# Patient Record
Sex: FEMALE | Race: OTHER | HISPANIC OR LATINO | Employment: UNEMPLOYED | URBAN - METROPOLITAN AREA
[De-identification: names, ages, dates, MRNs, and addresses within clinical notes are randomized per-mention and may not be internally consistent; named-entity substitution may affect disease eponyms.]

---

## 2017-05-23 ENCOUNTER — HOSPITAL ENCOUNTER (EMERGENCY)
Facility: HOSPITAL | Age: 32
Discharge: HOME | End: 2017-05-23
Attending: EMERGENCY MEDICINE | Admitting: EMERGENCY MEDICINE
Payer: COMMERCIAL

## 2017-05-23 VITALS
TEMPERATURE: 98.4 F | RESPIRATION RATE: 16 BRPM | DIASTOLIC BLOOD PRESSURE: 89 MMHG | OXYGEN SATURATION: 100 % | WEIGHT: 160 LBS | SYSTOLIC BLOOD PRESSURE: 135 MMHG | HEART RATE: 76 BPM

## 2017-05-23 DIAGNOSIS — R10.2 PELVIC PAIN: Primary | ICD-10-CM

## 2017-05-23 LAB
BACTERIA UR QL AUTO: ABNORMAL /HPF
BILIRUB UR QL STRIP: NEGATIVE
CLARITY UR: ABNORMAL
COLOR UR: YELLOW
GLUCOSE UR STRIP-MCNC: NEGATIVE MG/DL
HCG UR QL: NEGATIVE
HGB UR QL STRIP.AUTO: NEGATIVE
KETONES UR STRIP-MCNC: NEGATIVE MG/DL
LEUKOCYTE ESTERASE UR QL STRIP: NEGATIVE
NITRITE UR QL STRIP: NEGATIVE
NON-SQ EPI CELLS URNS QL MICRO: ABNORMAL /HPF
PH UR STRIP.AUTO: 5.5 [PH] (ref 4.5–8)
PROT UR STRIP-MCNC: ABNORMAL MG/DL
RBC #/AREA URNS AUTO: ABNORMAL /HPF
SP GR UR STRIP.AUTO: 1.02 (ref 1–1.03)
UROBILINOGEN UR QL STRIP.AUTO: 0.2 E.U./DL
WBC #/AREA URNS AUTO: ABNORMAL /HPF

## 2017-05-23 PROCEDURE — 81025 URINE PREGNANCY TEST: CPT | Performed by: EMERGENCY MEDICINE

## 2017-05-23 PROCEDURE — 99284 EMERGENCY DEPT VISIT MOD MDM: CPT

## 2017-05-23 PROCEDURE — 81002 URINALYSIS NONAUTO W/O SCOPE: CPT | Performed by: EMERGENCY MEDICINE

## 2017-05-23 PROCEDURE — 81001 URINALYSIS AUTO W/SCOPE: CPT

## 2017-05-23 RX ORDER — IBUPROFEN 400 MG/1
400 TABLET ORAL EVERY 6 HOURS PRN
Qty: 30 TABLET | Refills: 0 | Status: SHIPPED | OUTPATIENT
Start: 2017-05-23

## 2017-05-23 RX ORDER — IBUPROFEN 400 MG/1
400 TABLET ORAL ONCE
Status: COMPLETED | OUTPATIENT
Start: 2017-05-23 | End: 2017-05-23

## 2017-05-23 RX ADMIN — IBUPROFEN 400 MG: 400 TABLET ORAL at 20:23

## 2020-10-06 RX ORDER — LEVOCETIRIZINE DIHYDROCHLORIDE 5 MG/1
5 TABLET, FILM COATED ORAL EVERY EVENING
COMMUNITY

## 2020-10-06 RX ORDER — VENLAFAXINE HYDROCHLORIDE 75 MG/1
75 CAPSULE, EXTENDED RELEASE ORAL DAILY
COMMUNITY
End: 2020-12-07

## 2020-10-06 RX ORDER — FERROUS SULFATE 325(65) MG
325 TABLET ORAL
COMMUNITY

## 2020-10-06 RX ORDER — TRAZODONE HYDROCHLORIDE 50 MG/1
50 TABLET ORAL
COMMUNITY

## 2020-10-06 RX ORDER — EPINEPHRINE 0.15 MG/.3ML
0.15 INJECTION INTRAMUSCULAR ONCE
COMMUNITY

## 2020-10-06 RX ORDER — FAMOTIDINE 20 MG/1
20 TABLET, FILM COATED ORAL DAILY
COMMUNITY

## 2020-10-06 RX ORDER — LEVOTHYROXINE SODIUM 0.2 MG/1
175 TABLET ORAL DAILY
COMMUNITY

## 2020-10-06 RX ORDER — FLUCONAZOLE 150 MG/1
150 TABLET ORAL DAILY
COMMUNITY
End: 2020-12-07

## 2020-10-06 RX ORDER — ACETAZOLAMIDE 250 MG/1
250 TABLET ORAL 2 TIMES DAILY
COMMUNITY

## 2020-10-06 RX ORDER — DOCUSATE SODIUM 100 MG/1
100 CAPSULE, LIQUID FILLED ORAL 2 TIMES DAILY
COMMUNITY

## 2020-10-08 ENCOUNTER — ANESTHESIA EVENT (OUTPATIENT)
Dept: PERIOP | Facility: HOSPITAL | Age: 35
End: 2020-10-08
Payer: COMMERCIAL

## 2020-10-08 PROBLEM — F32.A DEPRESSION: Status: ACTIVE | Noted: 2020-10-08

## 2020-10-08 PROBLEM — E03.9 HYPOTHYROIDISM: Status: ACTIVE | Noted: 2020-10-08

## 2020-10-08 PROBLEM — F41.9 ANXIETY: Status: ACTIVE | Noted: 2020-10-08

## 2020-10-08 PROBLEM — G47.30 SLEEP APNEA: Status: ACTIVE | Noted: 2020-10-08

## 2020-10-08 PROBLEM — J45.909 ASTHMA: Status: ACTIVE | Noted: 2020-10-08

## 2020-10-09 ENCOUNTER — ANESTHESIA (OUTPATIENT)
Dept: PERIOP | Facility: HOSPITAL | Age: 35
End: 2020-10-09
Payer: COMMERCIAL

## 2020-10-09 ENCOUNTER — HOSPITAL ENCOUNTER (OUTPATIENT)
Facility: HOSPITAL | Age: 35
Setting detail: OUTPATIENT SURGERY
Discharge: HOME | End: 2020-10-09
Attending: COLON & RECTAL SURGERY | Admitting: COLON & RECTAL SURGERY
Payer: COMMERCIAL

## 2020-10-09 VITALS
BODY MASS INDEX: 34.91 KG/M2 | WEIGHT: 197 LBS | TEMPERATURE: 96.3 F | HEIGHT: 63 IN | DIASTOLIC BLOOD PRESSURE: 84 MMHG | SYSTOLIC BLOOD PRESSURE: 133 MMHG | RESPIRATION RATE: 18 BRPM | HEART RATE: 70 BPM | OXYGEN SATURATION: 100 %

## 2020-10-09 VITALS — HEART RATE: 87 BPM

## 2020-10-09 DIAGNOSIS — D17.23 BENIGN LIPOMATOUS NEOPLASM OF SKIN AND SUBCUTANEOUS TISSUE OF RIGHT LEG: ICD-10-CM

## 2020-10-09 PROBLEM — F41.9 ANXIETY: Status: RESOLVED | Noted: 2020-10-08 | Resolved: 2020-10-09

## 2020-10-09 PROBLEM — J45.909 ASTHMA: Status: RESOLVED | Noted: 2020-10-08 | Resolved: 2020-10-09

## 2020-10-09 LAB
EXT PREGNANCY TEST URINE: NEGATIVE
EXT. CONTROL: NORMAL

## 2020-10-09 PROCEDURE — 81025 URINE PREGNANCY TEST: CPT | Performed by: COLON & RECTAL SURGERY

## 2020-10-09 PROCEDURE — 88304 TISSUE EXAM BY PATHOLOGIST: CPT | Performed by: PATHOLOGY

## 2020-10-09 RX ORDER — ONDANSETRON 2 MG/ML
INJECTION INTRAMUSCULAR; INTRAVENOUS AS NEEDED
Status: DISCONTINUED | OUTPATIENT
Start: 2020-10-09 | End: 2020-10-09

## 2020-10-09 RX ORDER — ONDANSETRON 2 MG/ML
4 INJECTION INTRAMUSCULAR; INTRAVENOUS ONCE AS NEEDED
Status: DISCONTINUED | OUTPATIENT
Start: 2020-10-09 | End: 2020-10-09 | Stop reason: HOSPADM

## 2020-10-09 RX ORDER — MIDAZOLAM HYDROCHLORIDE 2 MG/2ML
INJECTION, SOLUTION INTRAMUSCULAR; INTRAVENOUS AS NEEDED
Status: DISCONTINUED | OUTPATIENT
Start: 2020-10-09 | End: 2020-10-09

## 2020-10-09 RX ORDER — SODIUM CHLORIDE, SODIUM LACTATE, POTASSIUM CHLORIDE, CALCIUM CHLORIDE 600; 310; 30; 20 MG/100ML; MG/100ML; MG/100ML; MG/100ML
100 INJECTION, SOLUTION INTRAVENOUS CONTINUOUS
Status: DISCONTINUED | OUTPATIENT
Start: 2020-10-09 | End: 2020-10-09 | Stop reason: HOSPADM

## 2020-10-09 RX ORDER — PROMETHAZINE HYDROCHLORIDE 25 MG/ML
12.5 INJECTION, SOLUTION INTRAMUSCULAR; INTRAVENOUS ONCE AS NEEDED
Status: DISCONTINUED | OUTPATIENT
Start: 2020-10-09 | End: 2020-10-09 | Stop reason: HOSPADM

## 2020-10-09 RX ORDER — MAGNESIUM HYDROXIDE 1200 MG/15ML
LIQUID ORAL AS NEEDED
Status: DISCONTINUED | OUTPATIENT
Start: 2020-10-09 | End: 2020-10-09 | Stop reason: HOSPADM

## 2020-10-09 RX ORDER — SODIUM CHLORIDE, SODIUM LACTATE, POTASSIUM CHLORIDE, CALCIUM CHLORIDE 600; 310; 30; 20 MG/100ML; MG/100ML; MG/100ML; MG/100ML
50 INJECTION, SOLUTION INTRAVENOUS CONTINUOUS
Status: DISCONTINUED | OUTPATIENT
Start: 2020-10-09 | End: 2020-10-09 | Stop reason: HOSPADM

## 2020-10-09 RX ORDER — SODIUM CHLORIDE, SODIUM LACTATE, POTASSIUM CHLORIDE, CALCIUM CHLORIDE 600; 310; 30; 20 MG/100ML; MG/100ML; MG/100ML; MG/100ML
INJECTION, SOLUTION INTRAVENOUS CONTINUOUS PRN
Status: DISCONTINUED | OUTPATIENT
Start: 2020-10-09 | End: 2020-10-09

## 2020-10-09 RX ORDER — LIDOCAINE HYDROCHLORIDE 10 MG/ML
INJECTION, SOLUTION EPIDURAL; INFILTRATION; INTRACAUDAL; PERINEURAL AS NEEDED
Status: DISCONTINUED | OUTPATIENT
Start: 2020-10-09 | End: 2020-10-09

## 2020-10-09 RX ORDER — FENTANYL CITRATE 50 UG/ML
INJECTION, SOLUTION INTRAMUSCULAR; INTRAVENOUS AS NEEDED
Status: DISCONTINUED | OUTPATIENT
Start: 2020-10-09 | End: 2020-10-09

## 2020-10-09 RX ORDER — PROPOFOL 10 MG/ML
INJECTION, EMULSION INTRAVENOUS CONTINUOUS PRN
Status: DISCONTINUED | OUTPATIENT
Start: 2020-10-09 | End: 2020-10-09

## 2020-10-09 RX ORDER — HYDROMORPHONE HCL/PF 1 MG/ML
0.5 SYRINGE (ML) INJECTION
Status: DISCONTINUED | OUTPATIENT
Start: 2020-10-09 | End: 2020-10-09 | Stop reason: HOSPADM

## 2020-10-09 RX ADMIN — LIDOCAINE HYDROCHLORIDE 50 MG: 10 INJECTION, SOLUTION EPIDURAL; INFILTRATION; INTRACAUDAL; PERINEURAL at 12:30

## 2020-10-09 RX ADMIN — SODIUM CHLORIDE, SODIUM LACTATE, POTASSIUM CHLORIDE, AND CALCIUM CHLORIDE: .6; .31; .03; .02 INJECTION, SOLUTION INTRAVENOUS at 12:15

## 2020-10-09 RX ADMIN — FENTANYL CITRATE 100 MCG: 50 INJECTION INTRAMUSCULAR; INTRAVENOUS at 12:30

## 2020-10-09 RX ADMIN — PROPOFOL 100 MCG/KG/MIN: 10 INJECTION, EMULSION INTRAVENOUS at 12:30

## 2020-10-09 RX ADMIN — MIDAZOLAM HYDROCHLORIDE 2 MG: 1 INJECTION, SOLUTION INTRAMUSCULAR; INTRAVENOUS at 12:23

## 2020-10-09 RX ADMIN — ONDANSETRON HYDROCHLORIDE 4 MG: 2 INJECTION, SOLUTION INTRAMUSCULAR; INTRAVENOUS at 12:30

## 2020-12-07 ENCOUNTER — HOSPITAL ENCOUNTER (EMERGENCY)
Facility: HOSPITAL | Age: 35
Discharge: HOME | End: 2020-12-07
Attending: EMERGENCY MEDICINE | Admitting: EMERGENCY MEDICINE
Payer: COMMERCIAL

## 2020-12-07 VITALS
TEMPERATURE: 98.3 F | RESPIRATION RATE: 16 BRPM | DIASTOLIC BLOOD PRESSURE: 92 MMHG | SYSTOLIC BLOOD PRESSURE: 136 MMHG | OXYGEN SATURATION: 100 % | WEIGHT: 205.03 LBS | HEART RATE: 86 BPM | BODY MASS INDEX: 36.32 KG/M2

## 2020-12-07 DIAGNOSIS — R10.9 ABDOMINAL PAIN: Primary | ICD-10-CM

## 2020-12-07 LAB
ALBUMIN SERPL BCP-MCNC: 3.5 G/DL (ref 3.5–5)
ALP SERPL-CCNC: 93 U/L (ref 46–116)
ALT SERPL W P-5'-P-CCNC: 33 U/L (ref 12–78)
ANION GAP SERPL CALCULATED.3IONS-SCNC: 11 MMOL/L (ref 4–13)
AST SERPL W P-5'-P-CCNC: 26 U/L (ref 5–45)
BASOPHILS # BLD AUTO: 0.05 THOUSANDS/ΜL (ref 0–0.1)
BASOPHILS NFR BLD AUTO: 1 % (ref 0–1)
BILIRUB DIRECT SERPL-MCNC: 0.04 MG/DL (ref 0–0.2)
BILIRUB SERPL-MCNC: 0.21 MG/DL (ref 0.2–1)
BILIRUB UR QL STRIP: NEGATIVE
BUN SERPL-MCNC: 13 MG/DL (ref 5–25)
CALCIUM SERPL-MCNC: 8.8 MG/DL (ref 8.3–10.1)
CHLORIDE SERPL-SCNC: 104 MMOL/L (ref 100–108)
CLARITY UR: CLEAR
CLARITY, POC: CLEAR
CO2 SERPL-SCNC: 24 MMOL/L (ref 21–32)
COLOR UR: YELLOW
COLOR, POC: YELLOW
CREAT SERPL-MCNC: 0.9 MG/DL (ref 0.6–1.3)
EOSINOPHIL # BLD AUTO: 0.53 THOUSAND/ΜL (ref 0–0.61)
EOSINOPHIL NFR BLD AUTO: 6 % (ref 0–6)
ERYTHROCYTE [DISTWIDTH] IN BLOOD BY AUTOMATED COUNT: 14.9 % (ref 11.6–15.1)
EXT PREG TEST URINE: NEGATIVE
EXT. CONTROL ED NAV: NORMAL
GFR SERPL CREATININE-BSD FRML MDRD: 83 ML/MIN/1.73SQ M
GLUCOSE SERPL-MCNC: 142 MG/DL (ref 65–140)
GLUCOSE UR STRIP-MCNC: NEGATIVE MG/DL
HCG SERPL QL: NEGATIVE
HCT VFR BLD AUTO: 36.9 % (ref 34.8–46.1)
HGB BLD-MCNC: 11.5 G/DL (ref 11.5–15.4)
HGB UR QL STRIP.AUTO: NEGATIVE
IMM GRANULOCYTES # BLD AUTO: 0.03 THOUSAND/UL (ref 0–0.2)
IMM GRANULOCYTES NFR BLD AUTO: 0 % (ref 0–2)
KETONES UR STRIP-MCNC: NEGATIVE MG/DL
LEUKOCYTE ESTERASE UR QL STRIP: NEGATIVE
LIPASE SERPL-CCNC: 146 U/L (ref 73–393)
LYMPHOCYTES # BLD AUTO: 3.73 THOUSANDS/ΜL (ref 0.6–4.47)
LYMPHOCYTES NFR BLD AUTO: 40 % (ref 14–44)
MCH RBC QN AUTO: 25.3 PG (ref 26.8–34.3)
MCHC RBC AUTO-ENTMCNC: 31.2 G/DL (ref 31.4–37.4)
MCV RBC AUTO: 81 FL (ref 82–98)
MONOCYTES # BLD AUTO: 0.72 THOUSAND/ΜL (ref 0.17–1.22)
MONOCYTES NFR BLD AUTO: 8 % (ref 4–12)
NEUTROPHILS # BLD AUTO: 4.36 THOUSANDS/ΜL (ref 1.85–7.62)
NEUTS SEG NFR BLD AUTO: 45 % (ref 43–75)
NITRITE UR QL STRIP: NEGATIVE
NRBC BLD AUTO-RTO: 0 /100 WBCS
PH UR STRIP.AUTO: 7 [PH] (ref 4.5–8)
PLATELET # BLD AUTO: 387 THOUSANDS/UL (ref 149–390)
PMV BLD AUTO: 10.4 FL (ref 8.9–12.7)
POTASSIUM SERPL-SCNC: 4 MMOL/L (ref 3.5–5.3)
PROT SERPL-MCNC: 7.5 G/DL (ref 6.4–8.2)
PROT UR STRIP-MCNC: NEGATIVE MG/DL
RBC # BLD AUTO: 4.55 MILLION/UL (ref 3.81–5.12)
SODIUM SERPL-SCNC: 139 MMOL/L (ref 136–145)
SP GR UR STRIP.AUTO: 1.02 (ref 1–1.03)
UROBILINOGEN UR QL STRIP.AUTO: 0.2 E.U./DL
WBC # BLD AUTO: 9.42 THOUSAND/UL (ref 4.31–10.16)

## 2020-12-07 PROCEDURE — 96375 TX/PRO/DX INJ NEW DRUG ADDON: CPT

## 2020-12-07 PROCEDURE — 96361 HYDRATE IV INFUSION ADD-ON: CPT

## 2020-12-07 PROCEDURE — 81003 URINALYSIS AUTO W/O SCOPE: CPT

## 2020-12-07 PROCEDURE — 85025 COMPLETE CBC W/AUTO DIFF WBC: CPT | Performed by: EMERGENCY MEDICINE

## 2020-12-07 PROCEDURE — 80048 BASIC METABOLIC PNL TOTAL CA: CPT | Performed by: EMERGENCY MEDICINE

## 2020-12-07 PROCEDURE — 36415 COLL VENOUS BLD VENIPUNCTURE: CPT | Performed by: EMERGENCY MEDICINE

## 2020-12-07 PROCEDURE — 83690 ASSAY OF LIPASE: CPT | Performed by: EMERGENCY MEDICINE

## 2020-12-07 PROCEDURE — 99284 EMERGENCY DEPT VISIT MOD MDM: CPT

## 2020-12-07 PROCEDURE — 81025 URINE PREGNANCY TEST: CPT | Performed by: EMERGENCY MEDICINE

## 2020-12-07 PROCEDURE — 99284 EMERGENCY DEPT VISIT MOD MDM: CPT | Performed by: EMERGENCY MEDICINE

## 2020-12-07 PROCEDURE — 84703 CHORIONIC GONADOTROPIN ASSAY: CPT | Performed by: EMERGENCY MEDICINE

## 2020-12-07 PROCEDURE — 80076 HEPATIC FUNCTION PANEL: CPT | Performed by: EMERGENCY MEDICINE

## 2020-12-07 PROCEDURE — 96374 THER/PROPH/DIAG INJ IV PUSH: CPT

## 2020-12-07 RX ORDER — KETOROLAC TROMETHAMINE 30 MG/ML
15 INJECTION, SOLUTION INTRAMUSCULAR; INTRAVENOUS ONCE
Status: COMPLETED | OUTPATIENT
Start: 2020-12-07 | End: 2020-12-07

## 2020-12-07 RX ORDER — DIPHENHYDRAMINE HYDROCHLORIDE 50 MG/ML
25 INJECTION INTRAMUSCULAR; INTRAVENOUS ONCE
Status: COMPLETED | OUTPATIENT
Start: 2020-12-07 | End: 2020-12-07

## 2020-12-07 RX ORDER — METOCLOPRAMIDE HYDROCHLORIDE 5 MG/ML
10 INJECTION INTRAMUSCULAR; INTRAVENOUS ONCE
Status: COMPLETED | OUTPATIENT
Start: 2020-12-07 | End: 2020-12-07

## 2020-12-07 RX ORDER — NAPROXEN 500 MG/1
500 TABLET ORAL 2 TIMES DAILY WITH MEALS
Qty: 20 TABLET | Refills: 0 | Status: SHIPPED | OUTPATIENT
Start: 2020-12-07 | End: 2020-12-17

## 2020-12-07 RX ADMIN — METOCLOPRAMIDE 10 MG: 5 INJECTION, SOLUTION INTRAMUSCULAR; INTRAVENOUS at 16:30

## 2020-12-07 RX ADMIN — DIPHENHYDRAMINE HYDROCHLORIDE 25 MG: 50 INJECTION, SOLUTION INTRAMUSCULAR; INTRAVENOUS at 16:30

## 2020-12-07 RX ADMIN — SODIUM CHLORIDE 1000 ML: 0.9 INJECTION, SOLUTION INTRAVENOUS at 16:30

## 2020-12-07 RX ADMIN — KETOROLAC TROMETHAMINE 15 MG: 30 INJECTION, SOLUTION INTRAMUSCULAR at 19:36

## 2021-11-20 ENCOUNTER — HOSPITAL ENCOUNTER (EMERGENCY)
Facility: HOSPITAL | Age: 36
Discharge: HOME | End: 2021-11-20
Attending: EMERGENCY MEDICINE | Admitting: EMERGENCY MEDICINE
Payer: COMMERCIAL

## 2021-11-20 VITALS
BODY MASS INDEX: 38.7 KG/M2 | HEART RATE: 79 BPM | DIASTOLIC BLOOD PRESSURE: 87 MMHG | SYSTOLIC BLOOD PRESSURE: 163 MMHG | TEMPERATURE: 98 F | RESPIRATION RATE: 18 BRPM | OXYGEN SATURATION: 99 % | WEIGHT: 218.48 LBS

## 2021-11-20 DIAGNOSIS — J34.89 RHINORRHEA: ICD-10-CM

## 2021-11-20 DIAGNOSIS — R09.81 NASAL CONGESTION: ICD-10-CM

## 2021-11-20 DIAGNOSIS — J06.9 URI (UPPER RESPIRATORY INFECTION): ICD-10-CM

## 2021-11-20 DIAGNOSIS — J02.9 ACUTE SORE THROAT: ICD-10-CM

## 2021-11-20 DIAGNOSIS — B34.9 ACUTE VIRAL SYNDROME: Primary | ICD-10-CM

## 2021-11-20 PROCEDURE — 99284 EMERGENCY DEPT VISIT MOD MDM: CPT | Performed by: EMERGENCY MEDICINE

## 2021-11-20 PROCEDURE — U0005 INFEC AGEN DETEC AMPLI PROBE: HCPCS | Performed by: EMERGENCY MEDICINE

## 2021-11-20 PROCEDURE — U0003 INFECTIOUS AGENT DETECTION BY NUCLEIC ACID (DNA OR RNA); SEVERE ACUTE RESPIRATORY SYNDROME CORONAVIRUS 2 (SARS-COV-2) (CORONAVIRUS DISEASE [COVID-19]), AMPLIFIED PROBE TECHNIQUE, MAKING USE OF HIGH THROUGHPUT TECHNOLOGIES AS DESCRIBED BY CMS-2020-01-R: HCPCS | Performed by: EMERGENCY MEDICINE

## 2021-11-20 PROCEDURE — 99283 EMERGENCY DEPT VISIT LOW MDM: CPT

## 2021-11-20 RX ORDER — OXYMETAZOLINE HYDROCHLORIDE 0.05 G/100ML
2 SPRAY NASAL 2 TIMES DAILY
Qty: 30 ML | Refills: 0 | Status: SHIPPED | OUTPATIENT
Start: 2021-11-20

## 2021-11-20 RX ORDER — FLUTICASONE PROPIONATE 50 MCG
1 SPRAY, SUSPENSION (ML) NASAL DAILY
Qty: 16 G | Refills: 0 | Status: SHIPPED | OUTPATIENT
Start: 2021-11-20

## 2021-11-20 RX ORDER — IBUPROFEN 600 MG/1
600 TABLET ORAL EVERY 6 HOURS PRN
Qty: 30 TABLET | Refills: 0 | Status: SHIPPED | OUTPATIENT
Start: 2021-11-20

## 2021-11-20 RX ORDER — BENZONATATE 100 MG/1
100 CAPSULE ORAL EVERY 8 HOURS
Qty: 21 CAPSULE | Refills: 0 | Status: SHIPPED | OUTPATIENT
Start: 2021-11-20

## 2021-11-20 RX ORDER — BROMPHENIRAMINE MALEATE, PSEUDOEPHEDRINE HYDROCHLORIDE, AND DEXTROMETHORPHAN HYDROBROMIDE 2; 30; 10 MG/5ML; MG/5ML; MG/5ML
5 SYRUP ORAL 4 TIMES DAILY PRN
Qty: 120 ML | Refills: 0 | Status: SHIPPED | OUTPATIENT
Start: 2021-11-20

## 2021-11-21 LAB — SARS-COV-2 RNA RESP QL NAA+PROBE: NEGATIVE

## 2021-12-30 ENCOUNTER — HOSPITAL ENCOUNTER (EMERGENCY)
Facility: HOSPITAL | Age: 36
Discharge: HOME | End: 2021-12-30
Attending: EMERGENCY MEDICINE
Payer: COMMERCIAL

## 2021-12-30 VITALS
TEMPERATURE: 97.8 F | DIASTOLIC BLOOD PRESSURE: 119 MMHG | RESPIRATION RATE: 20 BRPM | SYSTOLIC BLOOD PRESSURE: 180 MMHG | OXYGEN SATURATION: 100 % | HEART RATE: 99 BPM

## 2021-12-30 DIAGNOSIS — B34.9 ACUTE VIRAL SYNDROME: Primary | ICD-10-CM

## 2021-12-30 PROCEDURE — 99282 EMERGENCY DEPT VISIT SF MDM: CPT

## 2021-12-30 PROCEDURE — 87636 SARSCOV2 & INF A&B AMP PRB: CPT | Performed by: PHYSICIAN ASSISTANT

## 2021-12-30 PROCEDURE — 99284 EMERGENCY DEPT VISIT MOD MDM: CPT | Performed by: PHYSICIAN ASSISTANT

## 2021-12-30 NOTE — Clinical Note
Hernandez Stoner was seen and treated in our emergency department on 12/30/2021  Diagnosis:     José Miguelcory Bowers    She may return on this date:     Patient must quarantine until informed of COVID-19 test results  If negative, patient must be without symptoms for 24 hours prior to returning to work  If you have any questions or concerns, please don't hesitate to call        Arianna Wyatt PA-C    ______________________________           _______________          _______________  Hospital Representative                              Date                                Time

## 2021-12-31 NOTE — ED PROVIDER NOTES
HPI: Patient is a 39 y o  female who presents with 5 days of cough, headache, anorexia, fatigue, myalgias, diarrhea and nausea which the patient describes at mild  Dry, nonproductive cough, gradual onset, mild aching headache  Nausea and decreased appetite, but able to eat and drink without issue  No abdominal pain, vomiting  1-2 episodes of nonbloody diarrhea, has resolved  No chest pain, shortness of breath, fevers, vision changes, dizziness, lightheadedness, numbness, tingling, focal weakness  The patient has had contact with people with similar symptoms  The patient taken OTC medication with relief of symptoms  Allergies   Allergen Reactions    Shellfish-Derived Products - Food Allergy        Past Medical History:   Diagnosis Date    Anemia     iron def    Anxiety     Depression     Disease of thyroid gland     hypo    GERD (gastroesophageal reflux disease)     Hypertension     occ but no meds at present    Papilledema     pt is on Diamox and followed by optho    Seasonal allergies       Past Surgical History:   Procedure Laterality Date    HEMORRHOID SURGERY N/A 10/9/2020    Procedure: EXCISION LIPOMA OF BUTTOCKS;  Surgeon: Keanu Morales MD;  Location: [address] [place] [address];  Service: Colorectal    TOOTH EXTRACTION       Social History     Tobacco Use    Smoking status: Former Smoker     Start date: 10/6/2005    Smokeless tobacco: Never Used   Vaping Use    Vaping Use: Never used   Substance Use Topics    Alcohol use: Not Currently    Drug use: Never       Nursing notes reviewed  Physical Exam:  ED Triage Vitals [12/30/21 2015]   Temperature Pulse Respirations Blood Pressure SpO2   97 8 °F (36 6 °C) 99 20 (!) 180/119 100 %      Temp Source Heart Rate Source Patient Position - Orthostatic VS BP Location FiO2 (%)   Oral Monitor Sitting Right arm --      Pain Score       --           ROS: Positive for cough, headache, anorexia, fatigue, myalgias, diarrhea and nausea   The remainder of a 10 organ system ROS was otherwise unremarkable  General: awake, alert, no acute distress    Head: normocephalic, atraumatic    Eyes: no scleral icterus  Ears: external ears normal, hearing grossly intact  Nose: external exam grossly normal, negative nasal discharge  Neck: symmetric, No JVD noted, trachea midline  Pulmonary: no respiratory distress, no tachypnea noted  Lungs clear auscultation bilaterally  Cardiovascular: appears well perfused  Heart regular rate and rhythm  Abdomen: no distention noted  Normoactive bowel sounds, nontender to palpation  Musculoskeletal: no deformities noted, tone normal  Neuro: grossly non-focal  Psych: mood and affect appropriate    The patient is stable and has a history and physical exam consistent with a viral illness  COVID19 testing has been performed  I considered the patient's other medical conditions as applicable/noted above in my medical decision making  The patient is stable upon discharge  The plan is for supportive care at home  Extensive discussion with patient regarding COVID19, flu testing, precautions, treatment at home, education including home self-quarantine instructions  Provided education should results be positive or negative  Recommended follow-up with PCP for monitoring of symptoms  The management plan was discussed in detail with the patient at bedside and all questions were answered  Provided both verbal and written instructions  Reviewed red flag symptoms and strict return to ED instructions  Patient notes understanding and agrees to plan  The patient (and any family present) verbalized understanding of the discharge instructions and warnings that would necessitate return to the Emergency Department  All questions were answered prior to discharge      Medications - No data to display  Final diagnoses:   Acute viral syndrome     Time reflects when diagnosis was documented in both MDM as applicable and the Disposition within this note     Time User Action Codes Description Comment    12/30/2021  8:52 PM Billie Lara Add [B34 9] Acute viral syndrome       ED Disposition     ED Disposition Condition Date/Time Comment    Discharge Stable Thu Dec 30, 2021  8:52 PM Xavier Moore discharge to home/self care              Follow-up Information     Follow up With Specialties Details Why Contact Info Additional [address] [facility] Emergency Medicine  If symptoms worsen [place] [phone]  [address] [facility], [place], [address] [facility] , [place], [place], [address]    Follow-up with PCP for monitoring of symptoms         [address] [facility]   [address] [facility] [address], [address] [facility] [address] [address]  [address], [address], [address] [place], [place], [address] [place] [address]        Discharge Medication List as of 12/30/2021  8:53 PM      CONTINUE these medications which have NOT CHANGED    Details   acetaZOLAMIDE (DIAMOX) 250 mg tablet Take 250 mg by mouth 2 (two) times a day , Historical Med      benzonatate (TESSALON PERLES) 100 mg capsule Take 1 capsule (100 mg total) by mouth every 8 (eight) hours, Starting Sat 11/20/2021, Normal      brompheniramine-pseudoephedrine-DM 30-2-10 MG/5ML syrup Take 5 mL by mouth 4 (four) times a day as needed for allergies, Starting Sat 11/20/2021, Normal      docusate sodium (COLACE) 100 mg capsule Take 100 mg by mouth 2 (two) times a day, Historical Med      EPINEPHrine (EPIPEN JR) 0 15 mg/0 3 mL SOAJ Inject 0 15 mg into a muscle once, Historical Med      famotidine (PEPCID) 20 mg tablet Take 20 mg by mouth daily , Historical Med      ferrous sulfate 325 (65 Fe) mg tablet Take 325 mg by mouth daily with breakfast With vit c 100mg, Historical Med      fluticasone (FLONASE) 50 mcg/act nasal spray 1 spray into each nostril daily, Starting Sat 11/20/2021, Normal      !! ibuprofen (MOTRIN) 400 mg tablet Take 1 tablet by mouth every 6 (six) hours as needed for mild pain, Starting 5/23/2017, Until Discontinued, Print      !! ibuprofen (MOTRIN) 600 mg tablet Take 1 tablet (600 mg total) by mouth every 6 (six) hours as needed for mild pain, Starting Sat 11/20/2021, Normal      levocetirizine (XYZAL) 5 MG tablet Take 5 mg by mouth every evening, Historical Med      levothyroxine 200 mcg tablet Take 175 mcg by mouth daily , Historical Med      naproxen (NAPROSYN) 500 mg tablet Take 1 tablet (500 mg total) by mouth 2 (two) times a day with meals for 10 days, Starting Mon 12/7/2020, Until Thu 12/17/2020, Normal      oxymetazoline (AFRIN) 0 05 % nasal spray 2 sprays by Each Nare route 2 (two) times a day, Starting Sat 11/20/2021, Normal      traZODone (DESYREL) 50 mg tablet Take 50 mg by mouth daily at bedtime, Historical Med       !! - Potential duplicate medications found  Please discuss with provider  No discharge procedures on file      Electronically Signed by       Loretta Sorensen PA-C  12/31/21 4227

## 2022-01-05 LAB
FLUAV RNA RESP QL NAA+PROBE: POSITIVE
FLUBV RNA RESP QL NAA+PROBE: NEGATIVE
SARS-COV-2 RNA RESP QL NAA+PROBE: NEGATIVE

## 2022-01-23 ENCOUNTER — HOSPITAL ENCOUNTER (EMERGENCY)
Facility: HOSPITAL | Age: 37
Discharge: HOME | End: 2022-01-23
Attending: EMERGENCY MEDICINE | Admitting: EMERGENCY MEDICINE
Payer: COMMERCIAL

## 2022-01-23 VITALS
SYSTOLIC BLOOD PRESSURE: 149 MMHG | TEMPERATURE: 97.7 F | DIASTOLIC BLOOD PRESSURE: 81 MMHG | HEART RATE: 81 BPM | RESPIRATION RATE: 17 BRPM | OXYGEN SATURATION: 99 %

## 2022-01-23 DIAGNOSIS — N76.0 ACUTE VAGINITIS: Primary | ICD-10-CM

## 2022-01-23 PROCEDURE — 99284 EMERGENCY DEPT VISIT MOD MDM: CPT | Performed by: EMERGENCY MEDICINE

## 2022-01-23 PROCEDURE — 99283 EMERGENCY DEPT VISIT LOW MDM: CPT

## 2022-01-23 RX ORDER — FLUCONAZOLE 100 MG/1
200 TABLET ORAL ONCE
Status: COMPLETED | OUTPATIENT
Start: 2022-01-23 | End: 2022-01-23

## 2022-01-23 RX ORDER — PETROLATUM 0.61 G/G
CREAM TOPICAL 2 TIMES DAILY
Qty: 99 G | Refills: 0 | Status: SHIPPED | OUTPATIENT
Start: 2022-01-23 | End: 2022-02-02

## 2022-01-23 RX ADMIN — FLUCONAZOLE 200 MG: 100 TABLET ORAL at 01:36

## 2022-01-23 NOTE — ED NOTES
AVS reviewed with pt, pt verbalized understanding of d/c instructions  New medications reviewed  No questions or concerns at this time  VSS   Pt left ambulatory with steady gait; alert and oriented     Peg Mamta, ADEEL  01/23/22 6036

## 2022-01-23 NOTE — ED PROVIDER NOTES
Emergency Department Note- Ruthie Monteiro 39 y o  female MRN: [id number]    Unit/Bed#: ED 22 Encounter: [id number]        History of Present Illness     Patient is a 27-year-old female, says for last 1 month the skin on the outside of her vaginal area has been somewhat raw and irritated, it hurts when she goes to sit down or stand up or wipe herself after urinating  She has no true dysuria  She just finished her regular menstrual cycle but other than that there is no abnormal bleeding, no vaginal discharge  She says she is not sexually active  She says she saw her OBGYN about 3 or 4 months ago, because of there was some abnormal pigment around her vaginal area, but that point there is no pain or irritated skin  She was prescribed hydrocortisone cream which she said irritated in burned the area when she applied it and so she has not been applying it  She says that she did call her OBGYN regarding her 1 month of new symptoms, and reportedly was told they will call her back to schedule her appointment  Patient sees her OBGYN to the [facility]    She says she is a little frustrated with them and would like information about [address] [place] [address] OBGYN     REVIEW OF SYSTEMS     Constitutional:  No recent weight  gains or losses   Eyes:  No visual changes   ENT:  No tinnitus or hearing changes   Cardiac: No chest pain or palpitations   Respiratory:  No cough or shortness of breath   Abdominal:  No nausea or vomiting   Urinary: No dysuria or hematuria   Hematologic: No easy bruising or bleeding   Skin:  As per HPI   Musculoskeletal: No aches or pains   Neurologic: No weakness or sensory changes   Psychiatric: No mood changes      Historical Information   Past Medical History:   Diagnosis Date    Anemia     iron def    Anxiety     Depression     Disease of thyroid gland     hypo    GERD (gastroesophageal reflux disease)     Hypertension     occ but no meds at present    Papilledema     pt is on Diamox and followed by areli    Seasonal allergies      Past Surgical History:   Procedure Laterality Date    HEMORRHOID SURGERY N/A 10/9/2020    Procedure: EXCISION LIPOMA OF BUTTOCKS;  Surgeon: Tessy Drew MD;  Location: [address] [place] [address];  Service: Colorectal    TOOTH EXTRACTION       Social History   Social History     Substance and Sexual Activity   Alcohol Use Not Currently     Social History     Substance and Sexual Activity   Drug Use Never     Social History     Tobacco Use   Smoking Status Former Smoker    Start date: 10/6/2005   Smokeless Tobacco Never Used     Family History:   Family History   Problem Relation Age of Onset    Diabetes Mother     Hypertension Mother     Heart disease Father     Diabetes Father     Cancer Father        MEDICATIONS:  No current facility-administered medications on file prior to encounter       Current Outpatient Medications on File Prior to Encounter   Medication Sig Dispense Refill    acetaZOLAMIDE (DIAMOX) 250 mg tablet Take 250 mg by mouth 2 (two) times a day       benzonatate (TESSALON PERLES) 100 mg capsule Take 1 capsule (100 mg total) by mouth every 8 (eight) hours 21 capsule 0    brompheniramine-pseudoephedrine-DM 30-2-10 MG/5ML syrup Take 5 mL by mouth 4 (four) times a day as needed for allergies 120 mL 0    docusate sodium (COLACE) 100 mg capsule Take 100 mg by mouth 2 (two) times a day      EPINEPHrine (EPIPEN JR) 0 15 mg/0 3 mL SOAJ Inject 0 15 mg into a muscle once      famotidine (PEPCID) 20 mg tablet Take 20 mg by mouth daily       ferrous sulfate 325 (65 Fe) mg tablet Take 325 mg by mouth daily with breakfast With vit c 100mg      fluticasone (FLONASE) 50 mcg/act nasal spray 1 spray into each nostril daily 16 g 0    ibuprofen (MOTRIN) 400 mg tablet Take 1 tablet by mouth every 6 (six) hours as needed for mild pain 30 tablet 0    ibuprofen (MOTRIN) 600 mg tablet Take 1 tablet (600 mg total) by mouth every 6 (six) hours as needed for mild pain 30 tablet 0    levocetirizine (XYZAL) 5 MG tablet Take 5 mg by mouth every evening      levothyroxine 200 mcg tablet Take 175 mcg by mouth daily       naproxen (NAPROSYN) 500 mg tablet Take 1 tablet (500 mg total) by mouth 2 (two) times a day with meals for 10 days 20 tablet 0    oxymetazoline (AFRIN) 0 05 % nasal spray 2 sprays by Each Nare route 2 (two) times a day 30 mL 0    traZODone (DESYREL) 50 mg tablet Take 50 mg by mouth daily at bedtime       ALLERGIES:  Allergies   Allergen Reactions    Shellfish-Derived Products - Food Allergy        Vitals:    01/23/22 0048 01/23/22 0137   BP: 155/88 149/81   TempSrc:  Oral   Pulse: 92 81   Resp: 16 17   Patient Position - Orthostatic VS:  Sitting   Temp: 97 8 °F (36 6 °C) 97 7 °F (36 5 °C)       PHYSICAL EXAM    General:  Patient is well-appearing  Head:  Atraumatic  Eyes:  Conjunctiva pink  ENT:  Mucous membranes are moist  Neck:  Supple  Cardiac:  S1-S2, without murmurs  Lungs:  Clear to auscultation bilaterally  Abdomen:  Soft, nontender, normal bowel sounds, no CVA tenderness, no tympany, no rigidity, no guarding  Gynecologic:  Pelvic exam performed with female nurse Sara Briseno as a chaperone shows no evidence of external vaginal lesions, there is no groin any erythema or inflammation  She does have some dry skin around the opening of her vagina  There is no malodor, no purulent drainage or discharge  Speculum examination has no vaginal bleeding, no vaginal discharge  Bimanual examination has no no cervical motion tenderness, no adnexal tenderness  Extremities:  Normal range of motion  Neurologic:  Awake, fluent speech, normal comprehension  AAOx3     Skin:  Pink warm and dry  Psychiatric:  Alert, pleasant, cooperative            Labs Reviewed - No data to display    Medications   fluconazole (DIFLUCAN) tablet 200 mg (200 mg Oral Given 1/23/22 0136)       No orders to display            Assessment/Plan     ED Medical Decision Making:    Patient may have some mechanical irritation, and will treat with Eucerin  Given her itching, will give a single dose of Diflucan  Patient well-appearing overall, exam and history do not suggest PID, UTI or other significant acute pathology  Supportive care, importance of follow-up and return precautions were discussed with the patient, who expressed understanding  Time reflects when diagnosis was documented in both MDM as applicable and the Disposition within this note       Time User Action Codes Description Comment    1/23/2022  1:15 AM Lai Lu Add [N76 0] Acute vaginitis           ED Disposition       ED Disposition Condition Date/Time Comment    Discharge Stable Sun Jan 23, 2022  1:15 AM Kerri Webster discharge to home/self care                  Follow-up Information       Follow up With Specialties Details Why Contact Info Additional Information    Your OB/GYN at [facility] an appointment as soon as possible for a visit        [address] [place] [address] Obstetrics and Gynecology Schedule an appointment as soon as possible for a visit in 2 days  Rodger Esparza [phone]  [facility] , [address] [place] [address], [id number] [phone]            Discharge Medication List as of 1/23/2022  1:17 AM        START taking these medications    Details   Skin Protectants, Misc  (eucerin) cream Apply topically 2 (two) times a day for 10 days Apply BID to affected area, Starting Sun 1/23/2022, Until Wed 2/2/2022, Print                  Earl Rivera, DO  01/23/22 0210

## 2022-02-07 ENCOUNTER — APPOINTMENT (EMERGENCY)
Dept: RADIOLOGY | Facility: HOSPITAL | Age: 37
End: 2022-02-07
Payer: COMMERCIAL

## 2022-02-07 ENCOUNTER — HOSPITAL ENCOUNTER (EMERGENCY)
Facility: HOSPITAL | Age: 37
Discharge: HOME | End: 2022-02-07
Attending: EMERGENCY MEDICINE | Admitting: EMERGENCY MEDICINE
Payer: COMMERCIAL

## 2022-02-07 VITALS
BODY MASS INDEX: 39.52 KG/M2 | WEIGHT: 223.11 LBS | OXYGEN SATURATION: 98 % | TEMPERATURE: 97.5 F | RESPIRATION RATE: 23 BRPM | DIASTOLIC BLOOD PRESSURE: 88 MMHG | SYSTOLIC BLOOD PRESSURE: 129 MMHG | HEART RATE: 84 BPM

## 2022-02-07 DIAGNOSIS — I10 HYPERTENSION, UNSPECIFIED TYPE: Primary | ICD-10-CM

## 2022-02-07 DIAGNOSIS — R51.9 HEADACHE: ICD-10-CM

## 2022-02-07 LAB
ALBUMIN SERPL BCP-MCNC: 3.9 G/DL (ref 3.5–5)
ALP SERPL-CCNC: 92 U/L (ref 46–116)
ALT SERPL W P-5'-P-CCNC: 67 U/L (ref 12–78)
ANION GAP SERPL CALCULATED.3IONS-SCNC: 6 MMOL/L (ref 4–13)
AST SERPL W P-5'-P-CCNC: 46 U/L (ref 5–45)
ATRIAL RATE: 71 BPM
BASOPHILS # BLD AUTO: 0.07 THOUSANDS/ΜL (ref 0–0.1)
BASOPHILS NFR BLD AUTO: 1 % (ref 0–1)
BILIRUB SERPL-MCNC: 0.25 MG/DL (ref 0.2–1)
BUN SERPL-MCNC: 16 MG/DL (ref 5–25)
CALCIUM SERPL-MCNC: 9.7 MG/DL (ref 8.3–10.1)
CARDIAC TROPONIN I PNL SERPL HS: 2 NG/L
CHLORIDE SERPL-SCNC: 100 MMOL/L (ref 100–108)
CO2 SERPL-SCNC: 29 MMOL/L (ref 21–32)
CREAT SERPL-MCNC: 0.85 MG/DL (ref 0.6–1.3)
EOSINOPHIL # BLD AUTO: 0.56 THOUSAND/ΜL (ref 0–0.61)
EOSINOPHIL NFR BLD AUTO: 5 % (ref 0–6)
ERYTHROCYTE [DISTWIDTH] IN BLOOD BY AUTOMATED COUNT: 15.1 % (ref 11.6–15.1)
GFR SERPL CREATININE-BSD FRML MDRD: 87 ML/MIN/1.73SQ M
GLUCOSE SERPL-MCNC: 122 MG/DL (ref 65–140)
HCT VFR BLD AUTO: 38.4 % (ref 34.8–46.1)
HGB BLD-MCNC: 12.2 G/DL (ref 11.5–15.4)
IMM GRANULOCYTES # BLD AUTO: 0.05 THOUSAND/UL (ref 0–0.2)
IMM GRANULOCYTES NFR BLD AUTO: 0 % (ref 0–2)
LYMPHOCYTES # BLD AUTO: 5.34 THOUSANDS/ΜL (ref 0.6–4.47)
LYMPHOCYTES NFR BLD AUTO: 43 % (ref 14–44)
MCH RBC QN AUTO: 26.6 PG (ref 26.8–34.3)
MCHC RBC AUTO-ENTMCNC: 31.8 G/DL (ref 31.4–37.4)
MCV RBC AUTO: 84 FL (ref 82–98)
MONOCYTES # BLD AUTO: 0.92 THOUSAND/ΜL (ref 0.17–1.22)
MONOCYTES NFR BLD AUTO: 8 % (ref 4–12)
NEUTROPHILS # BLD AUTO: 5.26 THOUSANDS/ΜL (ref 1.85–7.62)
NEUTS SEG NFR BLD AUTO: 43 % (ref 43–75)
NRBC BLD AUTO-RTO: 0 /100 WBCS
P AXIS: 25 DEGREES
PLATELET # BLD AUTO: 337 THOUSANDS/UL (ref 149–390)
PMV BLD AUTO: 10.4 FL (ref 8.9–12.7)
POTASSIUM SERPL-SCNC: 3.8 MMOL/L (ref 3.5–5.3)
PR INTERVAL: 128 MS
PROT SERPL-MCNC: 7.7 G/DL (ref 6.4–8.2)
QRS AXIS: 25 DEGREES
QRSD INTERVAL: 86 MS
QT INTERVAL: 372 MS
QTC INTERVAL: 404 MS
RBC # BLD AUTO: 4.58 MILLION/UL (ref 3.81–5.12)
SODIUM SERPL-SCNC: 135 MMOL/L (ref 136–145)
T WAVE AXIS: 13 DEGREES
VENTRICULAR RATE: 71 BPM
WBC # BLD AUTO: 12.2 THOUSAND/UL (ref 4.31–10.16)

## 2022-02-07 PROCEDURE — 99284 EMERGENCY DEPT VISIT MOD MDM: CPT

## 2022-02-07 PROCEDURE — 84484 ASSAY OF TROPONIN QUANT: CPT | Performed by: PHYSICIAN ASSISTANT

## 2022-02-07 PROCEDURE — 93010 ELECTROCARDIOGRAM REPORT: CPT | Performed by: INTERNAL MEDICINE

## 2022-02-07 PROCEDURE — 93005 ELECTROCARDIOGRAM TRACING: CPT

## 2022-02-07 PROCEDURE — 85025 COMPLETE CBC W/AUTO DIFF WBC: CPT | Performed by: PHYSICIAN ASSISTANT

## 2022-02-07 PROCEDURE — 36415 COLL VENOUS BLD VENIPUNCTURE: CPT | Performed by: PHYSICIAN ASSISTANT

## 2022-02-07 PROCEDURE — 80053 COMPREHEN METABOLIC PANEL: CPT | Performed by: PHYSICIAN ASSISTANT

## 2022-02-07 PROCEDURE — 71045 X-RAY EXAM CHEST 1 VIEW: CPT

## 2022-02-07 PROCEDURE — 99285 EMERGENCY DEPT VISIT HI MDM: CPT | Performed by: PHYSICIAN ASSISTANT

## 2022-02-07 RX ORDER — ACETAMINOPHEN 160 MG/5ML
650 SUSPENSION, ORAL (FINAL DOSE FORM) ORAL ONCE
Status: COMPLETED | OUTPATIENT
Start: 2022-02-07 | End: 2022-02-07

## 2022-02-07 RX ORDER — ONDANSETRON 4 MG/1
4 TABLET, ORALLY DISINTEGRATING ORAL ONCE
Status: COMPLETED | OUTPATIENT
Start: 2022-02-07 | End: 2022-02-07

## 2022-02-07 RX ADMIN — ONDANSETRON 4 MG: 4 TABLET, ORALLY DISINTEGRATING ORAL at 01:05

## 2022-02-07 RX ADMIN — ACETAMINOPHEN 650 MG: 650 SUSPENSION ORAL at 01:04

## 2022-02-07 NOTE — ED PROVIDER NOTES
History  Chief Complaint   Patient presents with    Dizziness     Pt c/o dizziness, lightheadedness, and headaches since 7pm tonight, reports checked BP at home and was in the 200s  40 y/o F PMHx HTN, pseudotumor cerebri, anemia, anxiety/depression, hypothyroid, p/w LH, dizziness and headache x 6 hours  Patient states initially headache started frontal and is now generalized, pressure like, associated with photophobia  Patient checked BP at home, was 200s  Patient recently stared on lisinopril, has been taking her medications as prescribed  States she feels like the room is spinning when she closes her eyes, otherwise she is experiencing lightheadedness, feels "woozy"  Denies visual disturbance, black out vision, changes in hearing, syncope, numbness, tingling weakness, slurring speech, facial droop, gait disturbance, lack of coordination  Patient states she is having some nausea, no vomiting/diarrhea/abdominal pain  Patient also endorsing SOB which has improved, states it was more with expiration  Patient was initially having chest pain, no longer endorsing  Felt like pressure generalized in chest, improved with cold rang on her neck, denies radiation and diaphoresis  Patient states she is taking all medications as prescribed          History provided by:  Patient   used: No    Dizziness  Quality:  Lightheadedness  Severity:  Mild  Onset quality:  Gradual  Duration:  6 hours  Timing:  Intermittent  Progression:  Waxing and waning  Chronicity:  New  Relieved by:  None tried  Worsened by:  Closing eyes  Ineffective treatments:  None tried  Associated symptoms: chest pain, headaches, nausea and shortness of breath    Associated symptoms: no diarrhea, no hearing loss, no palpitations, no syncope, no tinnitus, no vision changes, no vomiting and no weakness    Chest pain:     Quality: pressure      Quality: not radiating      Severity:  Mild    Onset quality:  Gradual    Timing:  Intermittent Progression:  Resolved    Chronicity:  New  Headaches:     Severity:  Mild    Onset quality:  Gradual    Timing:  Constant    Progression:  Unchanged    Chronicity:  New  Nausea:     Severity:  Mild    Onset quality:  Gradual    Timing:  Constant    Progression:  Worsening  Shortness of breath:     Severity:  Mild    Onset quality:  Gradual    Timing:  Intermittent    Progression:  Improving  Risk factors: anemia        Prior to Admission Medications   Prescriptions Last Dose Informant Patient Reported? Taking?    EPINEPHrine (EPIPEN JR) 0 15 mg/0 3 mL SOAJ   Yes No   Sig: Inject 0 15 mg into a muscle once   Skin Protectants, Misc  (eucerin) cream   No No   Sig: Apply topically 2 (two) times a day for 10 days Apply BID to affected area   acetaZOLAMIDE (DIAMOX) 250 mg tablet   Yes No   Sig: Take 250 mg by mouth 2 (two) times a day    benzonatate (TESSALON PERLES) 100 mg capsule   No No   Sig: Take 1 capsule (100 mg total) by mouth every 8 (eight) hours   brompheniramine-pseudoephedrine-DM 30-2-10 MG/5ML syrup   No No   Sig: Take 5 mL by mouth 4 (four) times a day as needed for allergies   docusate sodium (COLACE) 100 mg capsule   Yes No   Sig: Take 100 mg by mouth 2 (two) times a day   famotidine (PEPCID) 20 mg tablet   Yes No   Sig: Take 20 mg by mouth daily    ferrous sulfate 325 (65 Fe) mg tablet   Yes No   Sig: Take 325 mg by mouth daily with breakfast With vit c 100mg   fluticasone (FLONASE) 50 mcg/act nasal spray   No No   Si spray into each nostril daily   ibuprofen (MOTRIN) 400 mg tablet   No No   Sig: Take 1 tablet by mouth every 6 (six) hours as needed for mild pain   ibuprofen (MOTRIN) 600 mg tablet   No No   Sig: Take 1 tablet (600 mg total) by mouth every 6 (six) hours as needed for mild pain   levocetirizine (XYZAL) 5 MG tablet   Yes No   Sig: Take 5 mg by mouth every evening   levothyroxine 200 mcg tablet   Yes No   Sig: Take 175 mcg by mouth daily    naproxen (NAPROSYN) 500 mg tablet   No No   Sig: Take 1 tablet (500 mg total) by mouth 2 (two) times a day with meals for 10 days   oxymetazoline (AFRIN) 0 05 % nasal spray   No No   Si sprays by Each Nare route 2 (two) times a day   traZODone (DESYREL) 50 mg tablet   Yes No   Sig: Take 50 mg by mouth daily at bedtime      Facility-Administered Medications: None       Past Medical History:   Diagnosis Date    Anemia     iron def    Anxiety     Depression     Disease of thyroid gland     hypo    GERD (gastroesophageal reflux disease)     Hypertension     occ but no meds at present    Papilledema     pt is on Diamox and followed by optho    Seasonal allergies        Past Surgical History:   Procedure Laterality Date    HEMORRHOID SURGERY N/A 10/9/2020    Procedure: EXCISION LIPOMA OF BUTTOCKS;  Surgeon: Jonathan Saenz MD;  Location: [address] [place] [address];  Service: Colorectal    TOOTH EXTRACTION         Family History   Problem Relation Age of Onset    Diabetes Mother     Hypertension Mother     Heart disease Father     Diabetes Father     Cancer Father      I have reviewed and agree with the history as documented  E-Cigarette/Vaping    E-Cigarette Use Never User      E-Cigarette/Vaping Substances     Social History     Tobacco Use    Smoking status: Former Smoker     Start date: 10/6/2005    Smokeless tobacco: Never Used   Vaping Use    Vaping Use: Never used   Substance Use Topics    Alcohol use: Not Currently    Drug use: Never       Review of Systems   Constitutional: Negative for diaphoresis and fever  HENT: Negative for congestion, ear pain, hearing loss, rhinorrhea, sinus pressure, sore throat and tinnitus  Eyes: Positive for photophobia  Negative for pain  Respiratory: Positive for shortness of breath  Negative for cough and wheezing  Cardiovascular: Positive for chest pain  Negative for palpitations, leg swelling and syncope  Gastrointestinal: Positive for nausea  Negative for abdominal pain, constipation, diarrhea and vomiting  Genitourinary: Negative for dysuria, flank pain and urgency  Musculoskeletal: Positive for neck pain  Negative for back pain  Skin: Negative for color change and rash  Neurological: Positive for dizziness, light-headedness and headaches  Negative for syncope, facial asymmetry, speech difficulty, weakness and numbness  All other systems reviewed and are negative  Physical Exam  Physical Exam  Vitals reviewed  Constitutional:       General: She is not in acute distress  Appearance: Normal appearance  She is obese  She is not ill-appearing, toxic-appearing or diaphoretic  HENT:      Head: Normocephalic and atraumatic  Right Ear: External ear normal       Left Ear: External ear normal       Nose: Nose normal  No congestion or rhinorrhea  Mouth/Throat:      Mouth: Mucous membranes are moist       Pharynx: Oropharynx is clear  No oropharyngeal exudate or posterior oropharyngeal erythema  Eyes:      General: Lids are normal  No scleral icterus  Right eye: No discharge  Left eye: No discharge  Extraocular Movements: Extraocular movements intact  Right eye: No nystagmus  Left eye: No nystagmus  Pupils: Pupils are equal, round, and reactive to light  Cardiovascular:      Rate and Rhythm: Normal rate and regular rhythm  Pulses: Normal pulses  Heart sounds: Normal heart sounds  No murmur heard  No friction rub  No gallop  Pulmonary:      Effort: Pulmonary effort is normal  No respiratory distress  Breath sounds: No wheezing, rhonchi or rales  Abdominal:      General: Abdomen is flat  There is no distension  Palpations: Abdomen is soft  There is no mass  Tenderness: There is no abdominal tenderness  Musculoskeletal:         General: No tenderness  Normal range of motion  Cervical back: Normal range of motion  No tenderness  Right lower leg: No edema  Left lower leg: No edema     Skin:     General: Skin is warm and dry  Capillary Refill: Capillary refill takes less than 2 seconds  Coloration: Skin is not pale  Findings: No erythema  Neurological:      General: No focal deficit present  Mental Status: She is alert and oriented to person, place, and time  GCS: GCS eye subscore is 4  GCS verbal subscore is 5  GCS motor subscore is 6  Cranial Nerves: No cranial nerve deficit, dysarthria or facial asymmetry  Sensory: Sensation is intact  No sensory deficit  Motor: Motor function is intact  No weakness or abnormal muscle tone  Coordination: Coordination is intact  Romberg sign negative  Coordination normal  Finger-Nose-Finger Test and Heel to [place] Test normal       Gait: Gait is intact   Gait normal    Psychiatric:         Mood and Affect: Mood normal          Behavior: Behavior normal          Vital Signs  ED Triage Vitals   Temperature Pulse Respirations Blood Pressure SpO2   02/07/22 0018 02/07/22 0018 02/07/22 0018 02/07/22 0018 02/07/22 0018   97 5 °F (36 4 °C) 90 18 (!) 177/112 99 %      Temp Source Heart Rate Source Patient Position - Orthostatic VS BP Location FiO2 (%)   02/07/22 0018 02/07/22 0018 02/07/22 0018 02/07/22 0018 --   Oral Monitor Sitting Right arm       Pain Score       02/07/22 0020       8           Vitals:    02/07/22 0018 02/07/22 0148 02/07/22 0200   BP: (!) 177/112 129/88 129/88   Pulse: 90 82 84   Patient Position - Orthostatic VS: Sitting Lying          Visual Acuity      ED Medications  Medications   acetaminophen (TYLENOL) oral suspension 650 mg (650 mg Oral Given 2/7/22 0104)   ondansetron (ZOFRAN-ODT) dispersible tablet 4 mg (4 mg Oral Given 2/7/22 0105)       Diagnostic Studies  Results Reviewed     Procedure Component Value Units Date/Time    HS Troponin 0hr (reflex protocol) [[id number]]  (Normal) Collected: 02/07/22 0059    Lab Status: Final result Specimen: Blood from Arm, Right Updated: 02/07/22 0130     hs TnI 0hr 2 ng/L     Comprehensive metabolic panel [[id number]]  (Abnormal) Collected: 02/07/22 0059    Lab Status: Final result Specimen: Blood from Arm, Right Updated: 02/07/22 0124     Sodium 135 mmol/L      Potassium 3 8 mmol/L      Chloride 100 mmol/L      CO2 29 mmol/L      ANION GAP 6 mmol/L      BUN 16 mg/dL      Creatinine 0 85 mg/dL      Glucose 122 mg/dL      Calcium 9 7 mg/dL      AST 46 U/L      ALT 67 U/L      Alkaline Phosphatase 92 U/L      Total Protein 7 7 g/dL      Albumin 3 9 g/dL      Total Bilirubin 0 25 mg/dL      eGFR 87 ml/min/1 73sq m     Narrative:      [place] guidelines for Chronic Kidney Disease (CKD):     Stage 1 with normal or high GFR (GFR > 90 mL/min/1 73 square meters)    Stage 2 Mild CKD (GFR = 60-89 mL/min/1 73 square meters)    Stage 3A Moderate CKD (GFR = 45-59 mL/min/1 73 square meters)    Stage 3B Moderate CKD (GFR = 30-44 mL/min/1 73 square meters)    Stage 4 Severe CKD (GFR = 15-29 mL/min/1 73 square meters)    Stage 5 End Stage CKD (GFR <15 mL/min/1 73 square meters)  Note: GFR calculation is accurate only with a steady state creatinine    CBC and differential [[id number]]  (Abnormal) Collected: 02/07/22 0059    Lab Status: Final result Specimen: Blood from Arm, Right Updated: 02/07/22 0108     WBC 12 20 Thousand/uL      RBC 4 58 Million/uL      Hemoglobin 12 2 g/dL      Hematocrit 38 4 %      MCV 84 fL      MCH 26 6 pg      MCHC 31 8 g/dL      RDW 15 1 %      MPV 10 4 fL      Platelets 444 Thousands/uL      nRBC 0 /100 WBCs      Neutrophils Relative 43 %      Immat GRANS % 0 %      Lymphocytes Relative 43 %      Monocytes Relative 8 %      Eosinophils Relative 5 %      Basophils Relative 1 %      Neutrophils Absolute 5 26 Thousands/µL      Immature Grans Absolute 0 05 Thousand/uL      Lymphocytes Absolute 5 34 Thousands/µL      Monocytes Absolute 0 92 Thousand/µL      Eosinophils Absolute 0 56 Thousand/µL      Basophils Absolute 0 07 Thousands/µL                  XR chest 1 view portable   ED Interpretation by Vahid Lovett PA-C (02/07 5244)   No acute cardiopulmonary disease                 Procedures  ECG 12 Lead Documentation Only    Date/Time: 2/7/2022 1:04 AM  Performed by: Vahid Lovett PA-C  Authorized by: Vahid Lovett PA-C     Indications / Diagnosis:  SOB, LH  ECG reviewed by me, the ED Provider: yes    Patient location:  ED  Previous ECG:     Previous ECG:  Unavailable  Interpretation:     Interpretation: normal    Rate:     ECG rate:  71    ECG rate assessment: normal    Rhythm:     Rhythm: sinus rhythm    Ectopy:     Ectopy: none    QRS:     QRS axis:  Normal    QRS intervals:  Normal  Conduction:     Conduction: normal    ST segments:     ST segments:  Normal  T waves:     T waves: normal               ED Course  ED Course as of 02/07/22 0228   Mon Feb 07, 2022   0018 Blood Pressure(!): 177/112  BP on arrival   0102 Tylenol for headache, zofran for nausea, will reassess  0105 ECG 12 lead  NSR   0105 hs TnI 0hr: 2  Symptoms >3hours ago, ekg NSR    0113 XR chest 1 view portable  No acute cardiopulmonary disease   0212 Blood Pressure: 129/88  BP at d/c, patient feeling better         MDM  Number of Diagnoses or Management Options  Headache: established and improving  Hypertension, unspecified type: established and improving  Diagnosis management comments: LH, headache, dizziness, SOB in setting HTN x 6 hours  Patient SBP at home 200s, 177 here  Physical exam unremarkable, no signs of neurologic deficits  Taking medications as presribed  Plan: CBC, CMP, Trop, CXR, EKG  Tylenol for headache, zofran for nausea  Headache improved with tylenol  BP down to 128/88 here  Patient stable d/c with outpatient follow up  Dispo: d/c home with outpatient PCP follow up for medication adjustment  Discussed strict return precautions  Patient agreeable and comfortable with plan      Prior to discharge, the management plan was discussed in detail with the patient at bedside, we provided both verbal and written instructions  The patient (and any family present) verbalized understanding of the discharge instructions and warnings that would necessitate return to the Emergency Department  All questions were answered and patient was comfortable with the plan of care and discharged to home  The patient verbalized understanding of our discussion and plan of care, and agrees to return to the Emergency Department for concerns and progression of illness  Patient stable at discharge  Amount and/or Complexity of Data Reviewed  Clinical lab tests: ordered and reviewed  Tests in the radiology section of CPT®: ordered and reviewed  Tests in the medicine section of CPT®: ordered and reviewed  Discuss the patient with other providers: yes  Independent visualization of images, tracings, or specimens: yes    Risk of Complications, Morbidity, and/or Mortality  Presenting problems: moderate  Diagnostic procedures: moderate  Management options: moderate    Patient Progress  Patient progress: improved      Disposition  Final diagnoses:   Headache   Hypertension, unspecified type     Time reflects when diagnosis was documented in both MDM as applicable and the Disposition within this note     Time User Action Codes Description Comment    2/7/2022  2:18 AM Olivia Bottom Add [R51 9] Headache     2/7/2022  2:18 AM Olivia Bottom Add [I10] Hypertension, unspecified type     2/7/2022  2:24 AM Olivia Bottom Modify [R51 9] Headache     2/7/2022  2:24 AM Olivia Bottom Modify [I10] Hypertension, unspecified type       ED Disposition     ED Disposition Condition Date/Time Comment    Discharge Stable Mon Feb 7, 2022  2:18 AM Veronica Camacho discharge to home/self care              Follow-up Information     Follow up With Specialties Details Why Contact Info    [facility] Schedule an appointment as soon as possible for a visit today Medication adjustment [address] [place] [address] [place] [address]  [phone]            Current Discharge Medication List      CONTINUE these medications which have NOT CHANGED    Details   acetaZOLAMIDE (DIAMOX) 250 mg tablet Take 250 mg by mouth 2 (two) times a day       benzonatate (TESSALON PERLES) 100 mg capsule Take 1 capsule (100 mg total) by mouth every 8 (eight) hours  Qty: 21 capsule, Refills: 0    Associated Diagnoses: Acute viral syndrome      brompheniramine-pseudoephedrine-DM 30-2-10 MG/5ML syrup Take 5 mL by mouth 4 (four) times a day as needed for allergies  Qty: 120 mL, Refills: 0    Associated Diagnoses: Acute viral syndrome      docusate sodium (COLACE) 100 mg capsule Take 100 mg by mouth 2 (two) times a day      EPINEPHrine (EPIPEN JR) 0 15 mg/0 3 mL SOAJ Inject 0 15 mg into a muscle once      famotidine (PEPCID) 20 mg tablet Take 20 mg by mouth daily       ferrous sulfate 325 (65 Fe) mg tablet Take 325 mg by mouth daily with breakfast With vit c 100mg      fluticasone (FLONASE) 50 mcg/act nasal spray 1 spray into each nostril daily  Qty: 16 g, Refills: 0    Associated Diagnoses: Acute viral syndrome      !! ibuprofen (MOTRIN) 400 mg tablet Take 1 tablet by mouth every 6 (six) hours as needed for mild pain  Qty: 30 tablet, Refills: 0      !! ibuprofen (MOTRIN) 600 mg tablet Take 1 tablet (600 mg total) by mouth every 6 (six) hours as needed for mild pain  Qty: 30 tablet, Refills: 0    Associated Diagnoses: Acute viral syndrome      levocetirizine (XYZAL) 5 MG tablet Take 5 mg by mouth every evening      levothyroxine 200 mcg tablet Take 175 mcg by mouth daily       naproxen (NAPROSYN) 500 mg tablet Take 1 tablet (500 mg total) by mouth 2 (two) times a day with meals for 10 days  Qty: 20 tablet, Refills: 0    Associated Diagnoses: Abdominal pain      oxymetazoline (AFRIN) 0 05 % nasal spray 2 sprays by Each Nare route 2 (two) times a day  Qty: 30 mL, Refills: 0    Associated Diagnoses: Acute viral syndrome      Skin Protectants, Misc  (eucerin) cream Apply topically 2 (two) times a day for 10 days Apply BID to affected area  Qty: 99 g, Refills: 0    Associated Diagnoses: Acute vaginitis      traZODone (DESYREL) 50 mg tablet Take 50 mg by mouth daily at bedtime       !! - Potential duplicate medications found  Please discuss with provider  No discharge procedures on file      PDMP Review     None          ED Provider  Electronically Signed by RUMA Meredith PA-C  02/07/22 6021

## 2022-02-07 NOTE — ED NOTES
Discharge instructions reviewed with patient  Patient verbalized understanding with no further questions at this time  Patient left ambulatory with steady gait at time of discharge       Jaqui Chambers RN  02/07/22 0878

## 2022-02-07 NOTE — DISCHARGE INSTRUCTIONS
Please return if symptoms worsen or new symptoms develop  Follow up with family doctor to adjust medication for high blood pressure  Can continue tylenol for headache  Avoid ibuprofen/motrin/aleve/advil with high blood pressure

## 2022-04-21 ENCOUNTER — HOSPITAL ENCOUNTER (EMERGENCY)
Facility: HOSPITAL | Age: 37
Discharge: HOME | End: 2022-04-22
Attending: EMERGENCY MEDICINE | Admitting: EMERGENCY MEDICINE
Payer: COMMERCIAL

## 2022-04-21 DIAGNOSIS — G89.18 ACUTE POST-OPERATIVE PAIN: Primary | ICD-10-CM

## 2022-04-21 DIAGNOSIS — K52.9 COLITIS: ICD-10-CM

## 2022-04-21 LAB
ALBUMIN SERPL BCP-MCNC: 3.8 G/DL (ref 3.5–5)
ALP SERPL-CCNC: 79 U/L (ref 46–116)
ALT SERPL W P-5'-P-CCNC: 68 U/L (ref 12–78)
ANION GAP SERPL CALCULATED.3IONS-SCNC: 5 MMOL/L (ref 4–13)
AST SERPL W P-5'-P-CCNC: 37 U/L (ref 5–45)
BASOPHILS # BLD AUTO: 0.05 THOUSANDS/ΜL (ref 0–0.1)
BASOPHILS NFR BLD AUTO: 1 % (ref 0–1)
BILIRUB DIRECT SERPL-MCNC: 0.11 MG/DL (ref 0–0.2)
BILIRUB SERPL-MCNC: 0.35 MG/DL (ref 0.2–1)
BUN SERPL-MCNC: 12 MG/DL (ref 5–25)
CALCIUM SERPL-MCNC: 9.3 MG/DL (ref 8.3–10.1)
CHLORIDE SERPL-SCNC: 108 MMOL/L (ref 100–108)
CO2 SERPL-SCNC: 29 MMOL/L (ref 21–32)
CREAT SERPL-MCNC: 0.96 MG/DL (ref 0.6–1.3)
EOSINOPHIL # BLD AUTO: 0.67 THOUSAND/ΜL (ref 0–0.61)
EOSINOPHIL NFR BLD AUTO: 7 % (ref 0–6)
ERYTHROCYTE [DISTWIDTH] IN BLOOD BY AUTOMATED COUNT: 12.8 % (ref 11.6–15.1)
GFR SERPL CREATININE-BSD FRML MDRD: 75 ML/MIN/1.73SQ M
GLUCOSE SERPL-MCNC: 103 MG/DL (ref 65–140)
HCG SERPL QL: NEGATIVE
HCT VFR BLD AUTO: 40.2 % (ref 34.8–46.1)
HGB BLD-MCNC: 12.5 G/DL (ref 11.5–15.4)
IMM GRANULOCYTES # BLD AUTO: 0.03 THOUSAND/UL (ref 0–0.2)
IMM GRANULOCYTES NFR BLD AUTO: 0 % (ref 0–2)
LIPASE SERPL-CCNC: 63 U/L (ref 73–393)
LYMPHOCYTES # BLD AUTO: 4.93 THOUSANDS/ΜL (ref 0.6–4.47)
LYMPHOCYTES NFR BLD AUTO: 49 % (ref 14–44)
MCH RBC QN AUTO: 26.5 PG (ref 26.8–34.3)
MCHC RBC AUTO-ENTMCNC: 31.1 G/DL (ref 31.4–37.4)
MCV RBC AUTO: 85 FL (ref 82–98)
MONOCYTES # BLD AUTO: 0.68 THOUSAND/ΜL (ref 0.17–1.22)
MONOCYTES NFR BLD AUTO: 7 % (ref 4–12)
NEUTROPHILS # BLD AUTO: 3.53 THOUSANDS/ΜL (ref 1.85–7.62)
NEUTS SEG NFR BLD AUTO: 36 % (ref 43–75)
NRBC BLD AUTO-RTO: 0 /100 WBCS
PLATELET # BLD AUTO: 437 THOUSANDS/UL (ref 149–390)
PMV BLD AUTO: 9.7 FL (ref 8.9–12.7)
POTASSIUM SERPL-SCNC: 4.3 MMOL/L (ref 3.5–5.3)
PROT SERPL-MCNC: 7.4 G/DL (ref 6.4–8.2)
RBC # BLD AUTO: 4.72 MILLION/UL (ref 3.81–5.12)
SODIUM SERPL-SCNC: 142 MMOL/L (ref 136–145)
WBC # BLD AUTO: 9.89 THOUSAND/UL (ref 4.31–10.16)

## 2022-04-21 PROCEDURE — 80048 BASIC METABOLIC PNL TOTAL CA: CPT | Performed by: EMERGENCY MEDICINE

## 2022-04-21 PROCEDURE — 99284 EMERGENCY DEPT VISIT MOD MDM: CPT | Performed by: EMERGENCY MEDICINE

## 2022-04-21 PROCEDURE — 85025 COMPLETE CBC W/AUTO DIFF WBC: CPT | Performed by: EMERGENCY MEDICINE

## 2022-04-21 PROCEDURE — 99284 EMERGENCY DEPT VISIT MOD MDM: CPT

## 2022-04-21 PROCEDURE — 96374 THER/PROPH/DIAG INJ IV PUSH: CPT

## 2022-04-21 PROCEDURE — 84703 CHORIONIC GONADOTROPIN ASSAY: CPT | Performed by: EMERGENCY MEDICINE

## 2022-04-21 PROCEDURE — 80076 HEPATIC FUNCTION PANEL: CPT | Performed by: EMERGENCY MEDICINE

## 2022-04-21 PROCEDURE — 83690 ASSAY OF LIPASE: CPT | Performed by: EMERGENCY MEDICINE

## 2022-04-21 PROCEDURE — 36415 COLL VENOUS BLD VENIPUNCTURE: CPT | Performed by: EMERGENCY MEDICINE

## 2022-04-21 RX ORDER — ONDANSETRON 2 MG/ML
4 INJECTION INTRAMUSCULAR; INTRAVENOUS ONCE
Status: COMPLETED | OUTPATIENT
Start: 2022-04-21 | End: 2022-04-21

## 2022-04-21 RX ADMIN — ONDANSETRON 4 MG: 2 INJECTION INTRAMUSCULAR; INTRAVENOUS at 23:03

## 2022-04-22 ENCOUNTER — APPOINTMENT (EMERGENCY)
Dept: CT IMAGING | Facility: HOSPITAL | Age: 37
End: 2022-04-22
Payer: COMMERCIAL

## 2022-04-22 VITALS
TEMPERATURE: 98.4 F | DIASTOLIC BLOOD PRESSURE: 71 MMHG | WEIGHT: 204.81 LBS | BODY MASS INDEX: 36.28 KG/M2 | RESPIRATION RATE: 18 BRPM | SYSTOLIC BLOOD PRESSURE: 103 MMHG | HEART RATE: 68 BPM | OXYGEN SATURATION: 98 %

## 2022-04-22 PROCEDURE — 74177 CT ABD & PELVIS W/CONTRAST: CPT

## 2022-04-22 PROCEDURE — G1004 CDSM NDSC: HCPCS

## 2022-04-22 RX ADMIN — IOHEXOL 100 ML: 350 INJECTION, SOLUTION INTRAVENOUS at 00:30

## 2022-04-22 NOTE — ED PROVIDER NOTES
Emergency Department Note- Shad Dickson 39 y o  female MRN: [id number]    Unit/Bed#: ED 24 Encounter: [id number]        History of Present Illness     Patient is a 25-year-old female, status post elective laparoscopic sleeve gastrectomy April 12, 2022 by Dr Hallie Gonzalez at [facility] facility in [place], [place], says after waking up from surgery she knows she had some abdominal bloating and crampy abdominal discomfort, predominantly around her umbilicus  She said she was told by her surgeons at that should go away after several days but if it persisted then she should go to the emergency department  The pain is relatively constant, she has been drinking a full liquid diet as recommended, but it hurts a bit more which she drinks liquid  She has not had any solid food  No constipation, no diarrhea, still having flatus  She has an appointment on Monday, 4 days from now with her surgeon for follow-up    She has no chest pain, shortness of breath, no fever, no chills, no myalgias or arthralgias    REVIEW OF SYSTEMS     Constitutional:  No recent weight  gains or losses   Eyes:  No visual changes   ENT:  No tinnitus or hearing changes   Cardiac: No chest pain or palpitations   Respiratory:  No cough or shortness of breath   Abdominal:  As per HPI   Urinary: No dysuria or hematuria   Hematologic: No easy bruising or bleeding   Skin: No rash   Musculoskeletal: No aches or pains   Neurologic: No weakness or sensory changes   Psychiatric: No mood changes      Historical Information   Past Medical History:   Diagnosis Date    Anemia     iron def    Anxiety     Depression     Disease of thyroid gland     hypo    GERD (gastroesophageal reflux disease)     Hypertension     occ but no meds at present    Papilledema     pt is on Diamox and followed by optho    Seasonal allergies      Past Surgical History:   Procedure Laterality Date    HEMORRHOID SURGERY N/A 10/9/2020    Procedure: EXCISION LIPOMA OF BUTTOCKS;  Surgeon: Araceli Guallpa MD;  Location: [address] [place] [address] OR;  Service: Colorectal    TOOTH EXTRACTION       Social History   Social History     Substance and Sexual Activity   Alcohol Use Not Currently     Social History     Substance and Sexual Activity   Drug Use Never     Social History     Tobacco Use   Smoking Status Former Smoker    Start date: 10/6/2005   Smokeless Tobacco Never Used     Family History:   Family History   Problem Relation Age of Onset    Diabetes Mother     Hypertension Mother     Heart disease Father     Diabetes Father     Cancer Father        MEDICATIONS:  No current facility-administered medications on file prior to encounter       Current Outpatient Medications on File Prior to Encounter   Medication Sig Dispense Refill    acetaZOLAMIDE (DIAMOX) 250 mg tablet Take 250 mg by mouth 2 (two) times a day       benzonatate (TESSALON PERLES) 100 mg capsule Take 1 capsule (100 mg total) by mouth every 8 (eight) hours 21 capsule 0    brompheniramine-pseudoephedrine-DM 30-2-10 MG/5ML syrup Take 5 mL by mouth 4 (four) times a day as needed for allergies 120 mL 0    docusate sodium (COLACE) 100 mg capsule Take 100 mg by mouth 2 (two) times a day      EPINEPHrine (EPIPEN JR) 0 15 mg/0 3 mL SOAJ Inject 0 15 mg into a muscle once      famotidine (PEPCID) 20 mg tablet Take 20 mg by mouth daily       ferrous sulfate 325 (65 Fe) mg tablet Take 325 mg by mouth daily with breakfast With vit c 100mg      fluticasone (FLONASE) 50 mcg/act nasal spray 1 spray into each nostril daily 16 g 0    ibuprofen (MOTRIN) 400 mg tablet Take 1 tablet by mouth every 6 (six) hours as needed for mild pain 30 tablet 0    ibuprofen (MOTRIN) 600 mg tablet Take 1 tablet (600 mg total) by mouth every 6 (six) hours as needed for mild pain 30 tablet 0    levocetirizine (XYZAL) 5 MG tablet Take 5 mg by mouth every evening      levothyroxine 200 mcg tablet Take 175 mcg by mouth daily       naproxen (NAPROSYN) 500 mg tablet Take 1 tablet (500 mg total) by mouth 2 (two) times a day with meals for 10 days 20 tablet 0    oxymetazoline (AFRIN) 0 05 % nasal spray 2 sprays by Each Nare route 2 (two) times a day 30 mL 0    Skin Protectants, Misc  (eucerin) cream Apply topically 2 (two) times a day for 10 days Apply BID to affected area 99 g 0    traZODone (DESYREL) 50 mg tablet Take 50 mg by mouth daily at bedtime       ALLERGIES:  Allergies   Allergen Reactions    Shellfish-Derived Products - Food Allergy        Vitals:    04/21/22 2157 04/22/22 0115   BP: 114/76 103/71   Pulse: 103 68   Resp: 18 18   Patient Position - Orthostatic VS: Sitting Lying   Temp: 98 4 °F (36 9 °C)        PHYSICAL EXAM    General:  Patient is well-appearing  Head:  Atraumatic  Eyes:  Conjunctiva pink  ENT:  Mucous membranes are moist  Neck:  Supple  Cardiac:  S1-S2, without murmurs  Lungs:  Clear to auscultation bilaterally  Abdomen:  Patient has some mild periumbilical tenderness, no tympany, no rigidity, no guarding  She has well-healing laparoscopic surgical incision sites secured with tissue adhesive, no signs of wound dehiscence, drainage or discharge or surrounding erythema    There is no CVA tenderness  Extremities:  Normal range of motion  Neurologic:  Awake, fluent speech, normal comprehension, AAOx3  Skin:  Pink warm and dry  Psychiatric:  Alert, pleasant, cooperative        Labs Reviewed   CBC AND DIFFERENTIAL - Abnormal       Result Value Ref Range Status    WBC 9 89  4 31 - 10 16 Thousand/uL Final    RBC 4 72  3 81 - 5 12 Million/uL Final    Hemoglobin 12 5  11 5 - 15 4 g/dL Final    Hematocrit 40 2  34 8 - 46 1 % Final    MCV 85  82 - 98 fL Final    MCH 26 5 (*) 26 8 - 34 3 pg Final    MCHC 31 1 (*) 31 4 - 37 4 g/dL Final    RDW 12 8  11 6 - 15 1 % Final    MPV 9 7  8 9 - 12 7 fL Final    Platelets 844 (*) 859 - 390 Thousands/uL Final    nRBC 0  /100 WBCs Final    Neutrophils Relative 36 (*) 43 - 75 % Final    Immat GRANS % 0  0 - 2 % Final    Lymphocytes Relative 49 (*) 14 - 44 % Final Monocytes Relative 7  4 - 12 % Final    Eosinophils Relative 7 (*) 0 - 6 % Final    Basophils Relative 1  0 - 1 % Final    Neutrophils Absolute 3 53  1 85 - 7 62 Thousands/µL Final    Immature Grans Absolute 0 03  0 00 - 0 20 Thousand/uL Final    Lymphocytes Absolute 4 93 (*) 0 60 - 4 47 Thousands/µL Final    Monocytes Absolute 0 68  0 17 - 1 22 Thousand/µL Final    Eosinophils Absolute 0 67 (*) 0 00 - 0 61 Thousand/µL Final    Basophils Absolute 0 05  0 00 - 0 10 Thousands/µL Final   LIPASE - Abnormal    Lipase 63 (*) 73 - 393 u/L Final   HEPATIC FUNCTION PANEL - Normal    Total Bilirubin 0 35  0 20 - 1 00 mg/dL Final    Comment: Use of this assay is not recommended for patients undergoing treatment with eltrombopag due to the potential for falsely elevated results  Bilirubin, Direct 0 11  0 00 - 0 20 mg/dL Final    Alkaline Phosphatase 79  46 - 116 U/L Final    AST 37  5 - 45 U/L Final    Comment: Specimen collection should occur prior to Sulfasalazine administration due to the potential for falsely depressed results  ALT 68  12 - 78 U/L Final    Comment: Specimen collection should occur prior to Sulfasalazine administration due to the potential for falsely depressed results  Total Protein 7 4  6 4 - 8 2 g/dL Final    Albumin 3 8  3 5 - 5 0 g/dL Final   PREGNANCY TEST (HCG QUALITATIVE) - Normal    Preg, Serum Negative  Negative Final   BASIC METABOLIC PANEL    Sodium 229  136 - 145 mmol/L Final    Potassium 4 3  3 5 - 5 3 mmol/L Final    Chloride 108  100 - 108 mmol/L Final    CO2 29  21 - 32 mmol/L Final    ANION GAP 5  4 - 13 mmol/L Final    BUN 12  5 - 25 mg/dL Final    Creatinine 0 96  0 60 - 1 30 mg/dL Final    Comment: Standardized to IDMS reference method    Glucose 103  65 - 140 mg/dL Final    Comment: If the patient is fasting, the ADA then defines impaired fasting glucose as > 100 mg/dL and diabetes as > or equal to 123 mg/dL    Specimen collection should occur prior to Sulfasalazine administration due to the potential for falsely depressed results  Specimen collection should occur prior to Sulfapyridine administration due to the potential for falsely elevated results  Calcium 9 3  8 3 - 10 1 mg/dL Final    eGFR 75  ml/min/1 73sq m Final    Narrative:     [place] guidelines for Chronic Kidney Disease (CKD):                     Stage 1 with normal or high GFR (GFR > 90 mL/min/1 73 square meters)                    Stage 2 Mild CKD (GFR = 60-89 mL/min/1 73 square meters)                    Stage 3A Moderate CKD (GFR = 45-59 mL/min/1 73 square meters)                    Stage 3B Moderate CKD (GFR = 30-44 mL/min/1 73 square meters)                    Stage 4 Severe CKD (GFR = 15-29 mL/min/1 73 square meters)                    Stage 5 End Stage CKD (GFR <15 mL/min/1 73 square meters)                  Note: GFR calculation is accurate only with a steady state creatinine       Medications   iohexol (OMNIPAQUE) 240 MG/ML solution 25 mL (has no administration in time range)   ondansetron (ZOFRAN) injection 4 mg (4 mg Intravenous Given 4/21/22 2303)   iohexol (OMNIPAQUE) 350 MG/ML injection (SINGLE-DOSE) 100 mL (100 mL Intravenous Given 4/22/22 0030)       CT abdomen pelvis w contrast   Final Result      Findings consistent with mild colitis at the ascending colon/hepatic flexure  Workstation performed: [id number]             ED Course as of 04/22/22 0148   Fri Apr 22, 2022   0140 On reassessment, patient comfortable, no change from above exam findings       Assessment/Plan     ED Medical Decision Making:    Patient overall well appearing, slight colitis on imaging, no travel history, no recent antibiotics, do not believe this is an infectious colitis which requires antibiotics  Patient stable and appropriate for discharge home with outpatient follow-up with her general surgeon as previously scheduled on Monday, April 25th  Supportive care, importance of follow-up and return precautions were discussed with the patient, who expressed understanding  Time reflects when diagnosis was documented in both MDM as applicable and the Disposition within this note       Time User Action Codes Description Comment    4/22/2022  1:41 AM Hugo Lewis [G89 [id number]] Acute post-operative pain     4/22/2022  1:41 AM Hugo Lewis [K52 9] Colitis           ED Disposition       ED Disposition Condition Date/Time Comment    Discharge Stable Fri Apr 22, 2022  1:42 AM Chelsea Rabago discharge to home/self care                  Follow-up Information       Follow up With Specialties Details Why Contact Info    Your surgeon  Go on 4/25/2022 Keep your scheduled appointment             New Prescriptions    No medications on file            El Sweet DO  04/22/22 0149

## 2022-04-22 NOTE — DISCHARGE INSTRUCTIONS
Acute Abdominal Pain     DISCHARGE INSTRUCTIONS:   Return to the emergency department if:   Your abdominal pain changes or gets worse  You vomit blood or cannot stop vomiting  You have blood in your bowel movement or it looks like tar  You have bleeding from your rectum  Your abdomen is larger than usual, more painful, and hard  You stop passing gas and having bowel movements  You feel weak, dizzy, or faint    You are concerned about anything else

## 2022-04-22 NOTE — ED NOTES
Patient transported to [facility] Angel Luis Bragg RN  04/22/22 8988

## 2022-05-07 ENCOUNTER — APPOINTMENT (EMERGENCY)
Dept: CT IMAGING | Facility: HOSPITAL | Age: 37
End: 2022-05-07
Payer: COMMERCIAL

## 2022-05-07 ENCOUNTER — HOSPITAL ENCOUNTER (EMERGENCY)
Facility: HOSPITAL | Age: 37
Discharge: HOME | End: 2022-05-07
Attending: EMERGENCY MEDICINE | Admitting: EMERGENCY MEDICINE
Payer: COMMERCIAL

## 2022-05-07 VITALS
TEMPERATURE: 97.9 F | RESPIRATION RATE: 16 BRPM | HEART RATE: 68 BPM | WEIGHT: 200.62 LBS | BODY MASS INDEX: 35.54 KG/M2 | DIASTOLIC BLOOD PRESSURE: 75 MMHG | OXYGEN SATURATION: 100 % | SYSTOLIC BLOOD PRESSURE: 127 MMHG

## 2022-05-07 DIAGNOSIS — R11.10 VOMITING: Primary | ICD-10-CM

## 2022-05-07 LAB
ALBUMIN SERPL BCP-MCNC: 3.8 G/DL (ref 3.5–5)
ALP SERPL-CCNC: 74 U/L (ref 46–116)
ALT SERPL W P-5'-P-CCNC: 53 U/L (ref 12–78)
ANION GAP SERPL CALCULATED.3IONS-SCNC: 7 MMOL/L (ref 4–13)
AST SERPL W P-5'-P-CCNC: 32 U/L (ref 5–45)
BACTERIA UR QL AUTO: ABNORMAL /HPF
BASOPHILS # BLD AUTO: 0.02 THOUSANDS/ΜL (ref 0–0.1)
BASOPHILS NFR BLD AUTO: 0 % (ref 0–1)
BILIRUB SERPL-MCNC: 0.31 MG/DL (ref 0.2–1)
BILIRUB UR QL STRIP: ABNORMAL
BUN SERPL-MCNC: 11 MG/DL (ref 5–25)
CALCIUM SERPL-MCNC: 9.2 MG/DL (ref 8.3–10.1)
CAOX CRY URNS QL MICRO: ABNORMAL /HPF
CHLORIDE SERPL-SCNC: 105 MMOL/L (ref 100–108)
CLARITY UR: CLEAR
CO2 SERPL-SCNC: 28 MMOL/L (ref 21–32)
COLOR UR: YELLOW
CREAT SERPL-MCNC: 0.86 MG/DL (ref 0.6–1.3)
EOSINOPHIL # BLD AUTO: 0.44 THOUSAND/ΜL (ref 0–0.61)
EOSINOPHIL NFR BLD AUTO: 5 % (ref 0–6)
ERYTHROCYTE [DISTWIDTH] IN BLOOD BY AUTOMATED COUNT: 12.2 % (ref 11.6–15.1)
EXT PREG TEST URINE: NEGATIVE
EXT. CONTROL ED NAV: NORMAL
GFR SERPL CREATININE-BSD FRML MDRD: 86 ML/MIN/1.73SQ M
GLUCOSE SERPL-MCNC: 109 MG/DL (ref 65–140)
GLUCOSE UR STRIP-MCNC: ABNORMAL MG/DL
HCT VFR BLD AUTO: 39 % (ref 34.8–46.1)
HGB BLD-MCNC: 12.7 G/DL (ref 11.5–15.4)
HGB UR QL STRIP.AUTO: NEGATIVE
HYALINE CASTS #/AREA URNS LPF: ABNORMAL /LPF
IMM GRANULOCYTES # BLD AUTO: 0.02 THOUSAND/UL (ref 0–0.2)
IMM GRANULOCYTES NFR BLD AUTO: 0 % (ref 0–2)
KETONES UR STRIP-MCNC: ABNORMAL MG/DL
LEUKOCYTE ESTERASE UR QL STRIP: NEGATIVE
LIPASE SERPL-CCNC: 97 U/L (ref 73–393)
LYMPHOCYTES # BLD AUTO: 3.38 THOUSANDS/ΜL (ref 0.6–4.47)
LYMPHOCYTES NFR BLD AUTO: 40 % (ref 14–44)
MCH RBC QN AUTO: 27.8 PG (ref 26.8–34.3)
MCHC RBC AUTO-ENTMCNC: 32.6 G/DL (ref 31.4–37.4)
MCV RBC AUTO: 85 FL (ref 82–98)
MONOCYTES # BLD AUTO: 0.65 THOUSAND/ΜL (ref 0.17–1.22)
MONOCYTES NFR BLD AUTO: 8 % (ref 4–12)
NEUTROPHILS # BLD AUTO: 3.85 THOUSANDS/ΜL (ref 1.85–7.62)
NEUTS SEG NFR BLD AUTO: 47 % (ref 43–75)
NITRITE UR QL STRIP: NEGATIVE
NON-SQ EPI CELLS URNS QL MICRO: ABNORMAL /HPF
NRBC BLD AUTO-RTO: 0 /100 WBCS
PH UR STRIP.AUTO: 5 [PH] (ref 4.5–8)
PLATELET # BLD AUTO: 399 THOUSANDS/UL (ref 149–390)
PMV BLD AUTO: 10.2 FL (ref 8.9–12.7)
POTASSIUM SERPL-SCNC: 4.1 MMOL/L (ref 3.5–5.3)
PROT SERPL-MCNC: 7.3 G/DL (ref 6.4–8.2)
PROT UR STRIP-MCNC: ABNORMAL MG/DL
RBC # BLD AUTO: 4.57 MILLION/UL (ref 3.81–5.12)
RBC #/AREA URNS AUTO: ABNORMAL /HPF
SODIUM SERPL-SCNC: 140 MMOL/L (ref 136–145)
SP GR UR STRIP.AUTO: >=1.03 (ref 1–1.03)
UROBILINOGEN UR QL STRIP.AUTO: 1 E.U./DL
WBC # BLD AUTO: 8.36 THOUSAND/UL (ref 4.31–10.16)
WBC #/AREA URNS AUTO: ABNORMAL /HPF

## 2022-05-07 PROCEDURE — 80053 COMPREHEN METABOLIC PANEL: CPT | Performed by: EMERGENCY MEDICINE

## 2022-05-07 PROCEDURE — 85025 COMPLETE CBC W/AUTO DIFF WBC: CPT | Performed by: EMERGENCY MEDICINE

## 2022-05-07 PROCEDURE — 99284 EMERGENCY DEPT VISIT MOD MDM: CPT | Performed by: EMERGENCY MEDICINE

## 2022-05-07 PROCEDURE — 81025 URINE PREGNANCY TEST: CPT | Performed by: EMERGENCY MEDICINE

## 2022-05-07 PROCEDURE — 96375 TX/PRO/DX INJ NEW DRUG ADDON: CPT

## 2022-05-07 PROCEDURE — 74176 CT ABD & PELVIS W/O CONTRAST: CPT

## 2022-05-07 PROCEDURE — 99284 EMERGENCY DEPT VISIT MOD MDM: CPT

## 2022-05-07 PROCEDURE — 96374 THER/PROPH/DIAG INJ IV PUSH: CPT

## 2022-05-07 PROCEDURE — G1004 CDSM NDSC: HCPCS

## 2022-05-07 PROCEDURE — 81001 URINALYSIS AUTO W/SCOPE: CPT

## 2022-05-07 PROCEDURE — 96361 HYDRATE IV INFUSION ADD-ON: CPT

## 2022-05-07 PROCEDURE — 36415 COLL VENOUS BLD VENIPUNCTURE: CPT | Performed by: EMERGENCY MEDICINE

## 2022-05-07 PROCEDURE — 83690 ASSAY OF LIPASE: CPT | Performed by: EMERGENCY MEDICINE

## 2022-05-07 RX ORDER — ONDANSETRON 2 MG/ML
4 INJECTION INTRAMUSCULAR; INTRAVENOUS ONCE
Status: COMPLETED | OUTPATIENT
Start: 2022-05-07 | End: 2022-05-07

## 2022-05-07 RX ORDER — FAMOTIDINE 10 MG/ML
20 INJECTION, SOLUTION INTRAVENOUS ONCE
Status: COMPLETED | OUTPATIENT
Start: 2022-05-07 | End: 2022-05-07

## 2022-05-07 RX ORDER — ONDANSETRON 4 MG/1
4 TABLET, ORALLY DISINTEGRATING ORAL EVERY 6 HOURS PRN
Qty: 10 TABLET | Refills: 0 | Status: SHIPPED | OUTPATIENT
Start: 2022-05-07

## 2022-05-07 RX ORDER — FAMOTIDINE 20 MG/1
20 TABLET, FILM COATED ORAL 2 TIMES DAILY
Qty: 60 TABLET | Refills: 0 | Status: SHIPPED | OUTPATIENT
Start: 2022-05-07

## 2022-05-07 RX ORDER — ACETAMINOPHEN 325 MG/1
650 TABLET ORAL ONCE
Status: COMPLETED | OUTPATIENT
Start: 2022-05-07 | End: 2022-05-07

## 2022-05-07 RX ADMIN — SODIUM CHLORIDE 1000 ML: 0.9 INJECTION, SOLUTION INTRAVENOUS at 19:52

## 2022-05-07 RX ADMIN — IOHEXOL 50 ML: 240 INJECTION, SOLUTION INTRATHECAL; INTRAVASCULAR; INTRAVENOUS; ORAL at 21:25

## 2022-05-07 RX ADMIN — FAMOTIDINE 20 MG: 10 INJECTION, SOLUTION INTRAVENOUS at 19:52

## 2022-05-07 RX ADMIN — ACETAMINOPHEN 650 MG: 325 TABLET ORAL at 21:01

## 2022-05-07 RX ADMIN — ONDANSETRON 4 MG: 2 INJECTION INTRAMUSCULAR; INTRAVENOUS at 19:52

## 2022-05-07 NOTE — ED PROVIDER NOTES
History  Chief Complaint   Patient presents with    Abdominal Pain     Pt states "a lot of vomitting, stomach pain, dizzy, lightheaded for 3 days"     A 48-year-old female with past medical history of idiopathic intracranial hypertension, depression, anxiety, hypertension and s/p laparoscopic sleeve gastrectomy (on 4/12/22 by Dr Migdalia Frost at [facility]); presents with nausea and vomiting for the past 3 days  Emesis has been nonbloody nonbilious  Patient states she has not been able to tolerate solid food, however has been able to intermittently tolerate liquids  Patient has also developed an upper abdominal discomfort since onset of the vomiting  Patient now also complains of feeling lightheaded  Patient otherwise denies fever, chills, chest pain, shortness of breath, diarrhea, dysuria, peripheral edema and rashes  Patient states she had been doing well since her recent bariatric surgery, reporting some nausea and vomiting immediately after surgery however none over the past several weeks  Patient is on a regular diet at this time  A/P:  Vomiting, associated with epigastric discomfort  Minimal reproducible epigastric tenderness  Given recent bariatric surgery, will check lab work and imaging for further evaluation of postop complications  Will treat symptomatically  History provided by:  Medical records and patient  Abdominal Pain  Associated symptoms: nausea and vomiting        Prior to Admission Medications   Prescriptions Last Dose Informant Patient Reported? Taking?    EPINEPHrine (EPIPEN JR) 0 15 mg/0 3 mL SOAJ   Yes No   Sig: Inject 0 15 mg into a muscle once   Skin Protectants, Misc  (eucerin) cream   No No   Sig: Apply topically 2 (two) times a day for 10 days Apply BID to affected area   acetaZOLAMIDE (DIAMOX) 250 mg tablet   Yes No   Sig: Take 250 mg by mouth 2 (two) times a day    benzonatate (TESSALON PERLES) 100 mg capsule   No No   Sig: Take 1 capsule (100 mg total) by mouth every 8 (eight) hours   brompheniramine-pseudoephedrine-DM 30-2-10 MG/5ML syrup   No No   Sig: Take 5 mL by mouth 4 (four) times a day as needed for allergies   docusate sodium (COLACE) 100 mg capsule   Yes No   Sig: Take 100 mg by mouth 2 (two) times a day   famotidine (PEPCID) 20 mg tablet   Yes No   Sig: Take 20 mg by mouth daily    ferrous sulfate 325 (65 Fe) mg tablet   Yes No   Sig: Take 325 mg by mouth daily with breakfast With vit c 100mg   fluticasone (FLONASE) 50 mcg/act nasal spray   No No   Si spray into each nostril daily   ibuprofen (MOTRIN) 400 mg tablet   No No   Sig: Take 1 tablet by mouth every 6 (six) hours as needed for mild pain   ibuprofen (MOTRIN) 600 mg tablet   No No   Sig: Take 1 tablet (600 mg total) by mouth every 6 (six) hours as needed for mild pain   levocetirizine (XYZAL) 5 MG tablet   Yes No   Sig: Take 5 mg by mouth every evening   levothyroxine 200 mcg tablet   Yes No   Sig: Take 175 mcg by mouth daily    naproxen (NAPROSYN) 500 mg tablet   No No   Sig: Take 1 tablet (500 mg total) by mouth 2 (two) times a day with meals for 10 days   oxymetazoline (AFRIN) 0 05 % nasal spray   No No   Si sprays by Each Nare route 2 (two) times a day   traZODone (DESYREL) 50 mg tablet   Yes No   Sig: Take 50 mg by mouth daily at bedtime      Facility-Administered Medications: None       Past Medical History:   Diagnosis Date    Anemia     iron def    Anxiety     Depression     Disease of thyroid gland     hypo    GERD (gastroesophageal reflux disease)     Hypertension     occ but no meds at present    Papilledema     pt is on Diamox and followed by optho    Seasonal allergies        Past Surgical History:   Procedure Laterality Date    HEMORRHOID SURGERY N/A 10/9/2020    Procedure: EXCISION LIPOMA OF BUTTOCKS;  Surgeon: Minda Soriano MD;  Location:  MAIN OR;  Service: Colorectal    TOOTH EXTRACTION         Family History   Problem Relation Age of Onset    Diabetes Mother     Hypertension Mother  Heart disease Father     Diabetes Father     Cancer Father      I have reviewed and agree with the history as documented  E-Cigarette/Vaping    E-Cigarette Use Never User      E-Cigarette/Vaping Substances     Social History     Tobacco Use    Smoking status: Former Smoker     Start date: 10/6/2005    Smokeless tobacco: Never Used   Vaping Use    Vaping Use: Never used   Substance Use Topics    Alcohol use: Not Currently    Drug use: Never       Review of Systems   Gastrointestinal: Positive for abdominal pain, nausea and vomiting  Neurological: Positive for light-headedness  All other systems reviewed and are negative  Physical Exam  Physical Exam  General Appearance: alert and oriented, nad, non toxic appearing  Skin:  Warm, dry, intact  HEENT: atraumatic, normocephalic  Neck: Supple, trachea midline  Cardiac: RRR; no murmurs, rub, gallops  Pulmonary: lungs CTAB; no wheezes, rales, rhonchi  Gastrointestinal: abdomen soft, epigastric tenderness, nondistended; no guarding or rebound tenderness; good bowel sounds, no mass or bruits    Laparoscopic surgical incisions clean/dry/intact  Extremities:  no pedal edema, 2+ pulses; no calf tenderness, no clubbing, no cyanosis  Neuro:  no focal motor or sensory deficits, CN 2-12 grossly intact  Psych:  Normal mood and affect, normal judgement and insight      Vital Signs  ED Triage Vitals   Temperature Pulse Respirations Blood Pressure SpO2   05/07/22 1852 05/07/22 1852 05/07/22 1852 05/07/22 1852 05/07/22 1852   97 9 °F (36 6 °C) 83 18 133/85 99 %      Temp src Heart Rate Source Patient Position - Orthostatic VS BP Location FiO2 (%)   -- 05/07/22 1957 05/07/22 1957 05/07/22 1957 --    Monitor Lying Right arm       Pain Score       05/07/22 1852       8           Vitals:    05/07/22 2000 05/07/22 2056 05/07/22 2100 05/07/22 2133   BP: 125/79 132/84 132/84 127/75   Pulse: 66 73 60 68   Patient Position - Orthostatic VS: Lying Lying Lying Lying Visual Acuity      ED Medications  Medications   sodium chloride 0 9 % bolus 1,000 mL (0 mL Intravenous Stopped 5/7/22 2133)   ondansetron (ZOFRAN) injection 4 mg (4 mg Intravenous Given 5/7/22 1952)   Famotidine (PF) (PEPCID) injection 20 mg (20 mg Intravenous Given 5/7/22 1952)   iohexol (OMNIPAQUE) 240 MG/ML solution 50 mL (50 mL Oral Given 5/7/22 2125)   acetaminophen (TYLENOL) tablet 650 mg (650 mg Oral Given 5/7/22 2101)       Diagnostic Studies  Results Reviewed     Procedure Component Value Units Date/Time    Urine Microscopic [[id number]]  (Abnormal) Collected: 05/07/22 1945    Lab Status: Final result Specimen: Urine, Clean Catch Updated: 05/07/22 2029     RBC, UA None Seen /hpf      WBC, UA 0-1 /hpf      Epithelial Cells Occasional /hpf      Bacteria, UA Occasional /hpf      Hyaline Casts, UA 0-1 /lpf      Ca Oxalate Luci, UA Innumerable /hpf     Narrative:      Concentrated Microscopic on low volume urine    Comprehensive metabolic panel [[id number]] Collected: 05/07/22 1942    Lab Status: Final result Specimen: Blood from Arm, Left Updated: 05/07/22 2006     Sodium 140 mmol/L      Potassium 4 1 mmol/L      Chloride 105 mmol/L      CO2 28 mmol/L      ANION GAP 7 mmol/L      BUN 11 mg/dL      Creatinine 0 86 mg/dL      Glucose 109 mg/dL      Calcium 9 2 mg/dL      AST 32 U/L      ALT 53 U/L      Alkaline Phosphatase 74 U/L      Total Protein 7 3 g/dL      Albumin 3 8 g/dL      Total Bilirubin 0 31 mg/dL      eGFR 86 ml/min/1 73sq m     Narrative:      [place] guidelines for Chronic Kidney Disease (CKD):     Stage 1 with normal or high GFR (GFR > 90 mL/min/1 73 square meters)    Stage 2 Mild CKD (GFR = 60-89 mL/min/1 73 square meters)    Stage 3A Moderate CKD (GFR = 45-59 mL/min/1 73 square meters)    Stage 3B Moderate CKD (GFR = 30-44 mL/min/1 73 square meters)    Stage 4 Severe CKD (GFR = 15-29 mL/min/1 73 square meters)    Stage 5 End Stage CKD (GFR <15 mL/min/1 73 square meters)  Note: GFR calculation is accurate only with a steady state creatinine    Lipase [[id number]]  (Normal) Collected: 05/07/22 1942    Lab Status: Final result Specimen: Blood from Arm, Left Updated: 05/07/22 2006     Lipase 97 u/L     CBC and differential [[id number]]  (Abnormal) Collected: 05/07/22 1942    Lab Status: Final result Specimen: Blood from Arm, Left Updated: 05/07/22 1951     WBC 8 36 Thousand/uL      RBC 4 57 Million/uL      Hemoglobin 12 7 g/dL      Hematocrit 39 0 %      MCV 85 fL      MCH 27 8 pg      MCHC 32 6 g/dL      RDW 12 2 %      MPV 10 2 fL      Platelets 019 Thousands/uL      nRBC 0 /100 WBCs      Neutrophils Relative 47 %      Immat GRANS % 0 %      Lymphocytes Relative 40 %      Monocytes Relative 8 %      Eosinophils Relative 5 %      Basophils Relative 0 %      Neutrophils Absolute 3 85 Thousands/µL      Immature Grans Absolute 0 02 Thousand/uL      Lymphocytes Absolute 3 38 Thousands/µL      Monocytes Absolute 0 65 Thousand/µL      Eosinophils Absolute 0 44 Thousand/µL      Basophils Absolute 0 02 Thousands/µL     POCT pregnancy, urine [[id number]]  (Normal) Resulted: 05/07/22 1950    Lab Status: Final result Updated: 05/07/22 1950     EXT PREG TEST UR (Ref: Negative) negative     Control valid    Urine Macroscopic, POC [[id number]]  (Abnormal) Collected: 05/07/22 1945    Lab Status: Final result Specimen: Urine Updated: 05/07/22 1947     Color, UA Yellow     Clarity, UA Clear     pH, UA 5 0     Leukocytes, UA Negative     Nitrite, UA Negative     Protein, UA Trace mg/dl      Glucose,  (1/10%) mg/dl      Ketones, UA Trace mg/dl      Urobilinogen, UA 1 0 E U /dl      Bilirubin, UA Interference- unable to analyze     Blood, UA Negative     Specific Gravity, UA >=1 030    Narrative:      CLINITEK RESULT                 CT abdomen pelvis wo contrast   Final Result by Mark Sanchez MD (05/07 2202)         No renal stones  No small bowel obstruction  Normal appendix  Consider contrast exam in the appropriate clinical setting  Workstation performed: [id number]                    Procedures  Procedures         ED Course  ED Course as of 05/07/22 2226   Sat May 07, 2022   2006 Labs [facility]   2058 Pt reports abdominal pain and nausea have improved  Complaining of a headache now, will give tylenol  Updated on lab results  Pending CTAP    2211 CT abdomen pelvis wo contrast  No renal stones  No small bowel obstruction  Normal appendix  2213 Pt updated on CT results  Pt denies being on PPI since her bariatric surgery  Recommend starting pepcid, pt in agreement  Recommend follow up with pt's surgeon  SBIRT 22yo+      Most Recent Value   SBIRT (22 yo +)    In order to provide better care to our patients, we are screening all of our patients for alcohol and drug use  Would it be okay to ask you these screening questions? Yes Filed at: 05/07/2022 1951   Initial Alcohol Screen: US AUDIT-C     1  How often do you have a drink containing alcohol? 0 Filed at: 05/07/2022 1951   2  How many drinks containing alcohol do you have on a typical day you are drinking? 0 Filed at: 05/07/2022 1951   3a  Male UNDER 65: How often do you have five or more drinks on one occasion? 0 Filed at: 05/07/2022 1951   3b  FEMALE Any Age, or MALE 65+: How often do you have 4 or more drinks on one occassion? 0 Filed at: 05/07/2022 1951   Audit-C Score 0 Filed at: 05/07/2022 1951   VISHNU: How many times in the past year have you    Used an illegal drug or used a prescription medication for non-medical reasons?  Never Filed at: 05/07/2022 1951                    MDM    Disposition  Final diagnoses:   Vomiting     Time reflects when diagnosis was documented in both MDM as applicable and the Disposition within this note     Time User Action Codes Description Comment    5/7/2022 10:16 PM [place], [address] [R11 10] Vomiting       ED Disposition     ED Disposition Condition Date/Time Comment    Discharge Stable Sat May 7, 2022 10:16 PM Ahsan Jeffries discharge to home/self care  Follow-up Information     Follow up With Specialties Details Why [address] Emergency Department Emergency Medicine Go to  If symptoms worsen [place] [id number]  [address] [facility] Emergency Department, [address] [facility] [place], [place], [address]          Patient's Medications   Discharge Prescriptions    FAMOTIDINE (PEPCID) 20 MG TABLET    Take 1 tablet (20 mg total) by mouth 2 (two) times a day       Start Date: 5/7/2022  End Date: --       Order Dose: 20 mg       Quantity: 60 tablet    Refills: 0    ONDANSETRON (ZOFRAN-ODT) 4 MG DISINTEGRATING TABLET    Take 1 tablet (4 mg total) by mouth every 6 (six) hours as needed for nausea       Start Date: 5/7/2022  End Date: --       Order Dose: 4 mg       Quantity: 10 tablet    Refills: 0       No discharge procedures on file      PDMP Review     None          ED Provider  Electronically Signed by           Malika Wilson DO  05/07/22 1074

## 2022-05-08 NOTE — DISCHARGE INSTRUCTIONS
Start taking pepcid, one tablet twice a day to help alleviate your symptoms    Follow up with your surgeon in the next 2-3 days, especially if symptoms persist

## 2022-06-20 ENCOUNTER — HOSPITAL ENCOUNTER (EMERGENCY)
Facility: HOSPITAL | Age: 37
Discharge: HOME | End: 2022-06-20
Attending: EMERGENCY MEDICINE
Payer: COMMERCIAL

## 2022-06-20 VITALS
HEART RATE: 72 BPM | DIASTOLIC BLOOD PRESSURE: 86 MMHG | TEMPERATURE: 97.4 F | SYSTOLIC BLOOD PRESSURE: 135 MMHG | RESPIRATION RATE: 16 BRPM | OXYGEN SATURATION: 97 %

## 2022-06-20 DIAGNOSIS — N93.9 ABNORMAL VAGINAL BLEEDING: Primary | ICD-10-CM

## 2022-06-20 LAB
ABO GROUP BLD: NORMAL
ALBUMIN SERPL BCP-MCNC: 3.6 G/DL (ref 3.5–5)
ALP SERPL-CCNC: 81 U/L (ref 46–116)
ALT SERPL W P-5'-P-CCNC: 45 U/L (ref 12–78)
ANION GAP SERPL CALCULATED.3IONS-SCNC: 6 MMOL/L (ref 4–13)
AST SERPL W P-5'-P-CCNC: 22 U/L (ref 5–45)
BACTERIA UR QL AUTO: ABNORMAL /HPF
BASOPHILS # BLD AUTO: 0.02 THOUSANDS/ΜL (ref 0–0.1)
BASOPHILS NFR BLD AUTO: 0 % (ref 0–1)
BILIRUB SERPL-MCNC: 0.48 MG/DL (ref 0.2–1)
BILIRUB UR QL STRIP: ABNORMAL
BLD GP AB SCN SERPL QL: NEGATIVE
BUN SERPL-MCNC: 11 MG/DL (ref 5–25)
CALCIUM SERPL-MCNC: 9.2 MG/DL (ref 8.3–10.1)
CHLORIDE SERPL-SCNC: 104 MMOL/L (ref 100–108)
CLARITY UR: ABNORMAL
CO2 SERPL-SCNC: 27 MMOL/L (ref 21–32)
COLOR UR: ABNORMAL
CREAT SERPL-MCNC: 0.88 MG/DL (ref 0.6–1.3)
EOSINOPHIL # BLD AUTO: 0.49 THOUSAND/ΜL (ref 0–0.61)
EOSINOPHIL NFR BLD AUTO: 6 % (ref 0–6)
ERYTHROCYTE [DISTWIDTH] IN BLOOD BY AUTOMATED COUNT: 12.6 % (ref 11.6–15.1)
EXT PREG TEST URINE: NORMAL
EXT. CONTROL ED NAV: NORMAL
GFR SERPL CREATININE-BSD FRML MDRD: 84 ML/MIN/1.73SQ M
GLUCOSE SERPL-MCNC: 96 MG/DL (ref 65–140)
GLUCOSE UR STRIP-MCNC: ABNORMAL MG/DL
HCT VFR BLD AUTO: 38.8 % (ref 34.8–46.1)
HGB BLD-MCNC: 12.7 G/DL (ref 11.5–15.4)
HGB UR QL STRIP.AUTO: ABNORMAL
IMM GRANULOCYTES # BLD AUTO: 0.02 THOUSAND/UL (ref 0–0.2)
IMM GRANULOCYTES NFR BLD AUTO: 0 % (ref 0–2)
KETONES UR STRIP-MCNC: NEGATIVE MG/DL
LEUKOCYTE ESTERASE UR QL STRIP: ABNORMAL
LYMPHOCYTES # BLD AUTO: 3.59 THOUSANDS/ΜL (ref 0.6–4.47)
LYMPHOCYTES NFR BLD AUTO: 44 % (ref 14–44)
MCH RBC QN AUTO: 27.5 PG (ref 26.8–34.3)
MCHC RBC AUTO-ENTMCNC: 32.7 G/DL (ref 31.4–37.4)
MCV RBC AUTO: 84 FL (ref 82–98)
MONOCYTES # BLD AUTO: 0.49 THOUSAND/ΜL (ref 0.17–1.22)
MONOCYTES NFR BLD AUTO: 6 % (ref 4–12)
NEUTROPHILS # BLD AUTO: 3.57 THOUSANDS/ΜL (ref 1.85–7.62)
NEUTS SEG NFR BLD AUTO: 44 % (ref 43–75)
NITRITE UR QL STRIP: POSITIVE
NON-SQ EPI CELLS URNS QL MICRO: ABNORMAL /HPF
NRBC BLD AUTO-RTO: 0 /100 WBCS
PH UR STRIP.AUTO: 5 [PH]
PLATELET # BLD AUTO: 351 THOUSANDS/UL (ref 149–390)
PMV BLD AUTO: 10.2 FL (ref 8.9–12.7)
POTASSIUM SERPL-SCNC: 4.4 MMOL/L (ref 3.5–5.3)
PROT SERPL-MCNC: 7.4 G/DL (ref 6.4–8.2)
PROT UR STRIP-MCNC: >=300 MG/DL
RBC # BLD AUTO: 4.61 MILLION/UL (ref 3.81–5.12)
RBC #/AREA URNS AUTO: ABNORMAL /HPF
RH BLD: POSITIVE
SODIUM SERPL-SCNC: 137 MMOL/L (ref 136–145)
SP GR UR STRIP.AUTO: 1.02 (ref 1–1.03)
SPECIMEN EXPIRATION DATE: NORMAL
UROBILINOGEN UR QL STRIP.AUTO: 4 E.U./DL
WBC # BLD AUTO: 8.18 THOUSAND/UL (ref 4.31–10.16)
WBC #/AREA URNS AUTO: ABNORMAL /HPF

## 2022-06-20 PROCEDURE — 96374 THER/PROPH/DIAG INJ IV PUSH: CPT

## 2022-06-20 PROCEDURE — 80053 COMPREHEN METABOLIC PANEL: CPT

## 2022-06-20 PROCEDURE — 99284 EMERGENCY DEPT VISIT MOD MDM: CPT

## 2022-06-20 PROCEDURE — 86850 RBC ANTIBODY SCREEN: CPT

## 2022-06-20 PROCEDURE — 99285 EMERGENCY DEPT VISIT HI MDM: CPT

## 2022-06-20 PROCEDURE — 96361 HYDRATE IV INFUSION ADD-ON: CPT

## 2022-06-20 PROCEDURE — 86901 BLOOD TYPING SEROLOGIC RH(D): CPT

## 2022-06-20 PROCEDURE — 86900 BLOOD TYPING SEROLOGIC ABO: CPT

## 2022-06-20 PROCEDURE — 85025 COMPLETE CBC W/AUTO DIFF WBC: CPT

## 2022-06-20 PROCEDURE — 96375 TX/PRO/DX INJ NEW DRUG ADDON: CPT

## 2022-06-20 PROCEDURE — 36415 COLL VENOUS BLD VENIPUNCTURE: CPT

## 2022-06-20 PROCEDURE — 81001 URINALYSIS AUTO W/SCOPE: CPT

## 2022-06-20 PROCEDURE — 81025 URINE PREGNANCY TEST: CPT

## 2022-06-20 RX ORDER — ONDANSETRON 4 MG/1
4 TABLET, ORALLY DISINTEGRATING ORAL EVERY 6 HOURS PRN
Qty: 20 TABLET | Refills: 0 | Status: SHIPPED | OUTPATIENT
Start: 2022-06-20

## 2022-06-20 RX ORDER — ONDANSETRON 2 MG/ML
4 INJECTION INTRAMUSCULAR; INTRAVENOUS ONCE
Status: COMPLETED | OUTPATIENT
Start: 2022-06-20 | End: 2022-06-20

## 2022-06-20 RX ORDER — ACETAMINOPHEN 325 MG/1
650 TABLET ORAL EVERY 6 HOURS PRN
Qty: 30 TABLET | Refills: 0 | Status: SHIPPED | OUTPATIENT
Start: 2022-06-20

## 2022-06-20 RX ADMIN — SODIUM CHLORIDE 1000 ML: 0.9 INJECTION, SOLUTION INTRAVENOUS at 11:30

## 2022-06-20 RX ADMIN — ONDANSETRON 4 MG: 2 INJECTION INTRAMUSCULAR; INTRAVENOUS at 11:28

## 2022-06-20 RX ADMIN — MORPHINE SULFATE 2 MG: 2 INJECTION, SOLUTION INTRAMUSCULAR; INTRAVENOUS at 11:31

## 2022-06-20 NOTE — Clinical Note
Nancy Gutierrez was seen and treated in our emergency department on 6/20/2022  Diagnosis:     Semaj Bean  may return to work on return date  She may return on this date: 06/21/2022         If you have any questions or concerns, please don't hesitate to call        Cristobal Solomon    ______________________________           _______________          _______________  Hospital Representative                              Date                                Time

## 2022-06-20 NOTE — ED PROVIDER NOTES
History  Chief Complaint   Patient presents with    Vaginal Bleeding     Pt reports heavy vaginal bleeding that started last week; Pt is a 35yF with PMH of HTN, hypothyroidism, GERD, and gastric sleeve presenting for evaluation of abnormal vaginal bleeding  Pt notes she has regular menses in April and May and then this month starting 5 days ago had heavy vaginal bleeding  She notes dark maroon blood with passage of blood clots as large as the size of a quarter  She notes saturation of approximately 1 extra large pad per hour  Last night, she used a super plus tampon and two pads and notes she soaked through onto her sheets  She endorses nausea and lower abdominal cramping 10/10 pain (waxing and waning) with this bleeding  She denies oral contraceptives and hormone therapy  She notes she had heavy bleeding when she was pregnant approx 12 years ago but has taken a negative urine pregnancy test  She presents with her partner and they deny risk of STIs  She endorses dizziness on exertion and fatigue  She denies fever, chills, cough, CP/SOB, vomiting, diarrhea, blood in stool, and urinary complaints  She notes her 5yo daughter had RSV last week and she has a sore throat with no other sx         History provided by:  Patient   used: No    Vaginal Bleeding  Quality:  Dark red, clots and heavier than menses  Severity:  Moderate  Onset quality:  Sudden  Timing:  Constant  Progression:  Waxing and waning  Chronicity:  New  Menstrual history:  Regular  Number of pads used:  1 heavy pad per hour  Possible pregnancy: no    Context: not after intercourse, not after urination and not at rest    Relieved by:  Nothing  Worsened by:  Nothing  Ineffective treatments:  Rest and position  Associated symptoms: abdominal pain (suprapubic), dizziness, fatigue and nausea    Associated symptoms: no back pain, no dyspareunia, no dysuria, no fever and no vaginal discharge    Abdominal pain:     Location:  Suprapubic Quality: bloating and cramping      Severity:  Moderate    Onset quality:  Unable to specify    Duration:  5 days    Timing:  Intermittent    Progression:  Waxing and waning    Chronicity:  New  Dizziness:     Severity:  Mild    Duration:  4 days    Timing:  Intermittent    Progression:  Unable to specify  Fatigue:     Severity:  Mild    Duration:  4 days    Timing:  Constant    Progression:  Unchanged  Nausea:     Severity:  Mild    Onset quality:  Unable to specify    Duration:  4 days    Timing:  Intermittent    Progression:  Waxing and waning  Risk factors: unprotected sex ( had vasectomy)    Risk factors: no bleeding disorder, no hx of ectopic pregnancy, no hx of endometriosis, no gynecological surgery, does not have multiple partners, no new sexual partner, no ovarian cysts, no ovarian torsion, no PID, no prior miscarriage, no STD, no STD exposure and no terminated pregnancies        Prior to Admission Medications   Prescriptions Last Dose Informant Patient Reported? Taking?    EPINEPHrine (EPIPEN JR) 0 15 mg/0 3 mL SOAJ   Yes Yes   Sig: Inject 0 15 mg into a muscle once   Skin Protectants, Misc  (eucerin) cream   No No   Sig: Apply topically 2 (two) times a day for 10 days Apply BID to affected area   acetaZOLAMIDE (DIAMOX) 250 mg tablet   Yes Yes   Sig: Take 250 mg by mouth 2 (two) times a day    benzonatate (TESSALON PERLES) 100 mg capsule   No Yes   Sig: Take 1 capsule (100 mg total) by mouth every 8 (eight) hours   brompheniramine-pseudoephedrine-DM 30-2-10 MG/5ML syrup   No Yes   Sig: Take 5 mL by mouth 4 (four) times a day as needed for allergies   docusate sodium (COLACE) 100 mg capsule   Yes No   Sig: Take 100 mg by mouth 2 (two) times a day   famotidine (PEPCID) 20 mg tablet   Yes Yes   Sig: Take 20 mg by mouth daily    famotidine (PEPCID) 20 mg tablet 6/19/2022 at Unknown time  No Yes   Sig: Take 1 tablet (20 mg total) by mouth 2 (two) times a day   ferrous sulfate 325 (65 Fe) mg tablet   Yes No   Sig: Take 325 mg by mouth daily with breakfast With vit c 100mg   fluticasone (FLONASE) 50 mcg/act nasal spray   No Yes   Si spray into each nostril daily   ibuprofen (MOTRIN) 400 mg tablet   No No   Sig: Take 1 tablet by mouth every 6 (six) hours as needed for mild pain   ibuprofen (MOTRIN) 600 mg tablet   No No   Sig: Take 1 tablet (600 mg total) by mouth every 6 (six) hours as needed for mild pain   levocetirizine (XYZAL) 5 MG tablet   Yes Yes   Sig: Take 5 mg by mouth every evening   levothyroxine 200 mcg tablet 2022 at Unknown time  Yes Yes   Sig: Take 175 mcg by mouth daily    naproxen (NAPROSYN) 500 mg tablet   No No   Sig: Take 1 tablet (500 mg total) by mouth 2 (two) times a day with meals for 10 days   ondansetron (ZOFRAN-ODT) 4 mg disintegrating tablet   No Yes   Sig: Take 1 tablet (4 mg total) by mouth every 6 (six) hours as needed for nausea   oxymetazoline (AFRIN) 0 05 % nasal spray   No Yes   Si sprays by Each Nare route 2 (two) times a day   traZODone (DESYREL) 50 mg tablet Past Week at Unknown time  Yes Yes   Sig: Take 50 mg by mouth daily at bedtime      Facility-Administered Medications: None       Past Medical History:   Diagnosis Date    Anemia     iron def    Anxiety     Depression     Disease of thyroid gland     hypo    GERD (gastroesophageal reflux disease)     Hypertension     occ but no meds at present    Papilledema     pt is on Diamox and followed by optho    Seasonal allergies        Past Surgical History:   Procedure Laterality Date    HEMORRHOID SURGERY N/A 10/9/2020    Procedure: EXCISION LIPOMA OF BUTTOCKS;  Surgeon: Rigo Frazier MD;  [facility] MA[facility];  Service: Colorectal    TOOTH EXTRACTION         Family History   Problem Relation Age of Onset    Diabetes Mother     Hypertension Mother     Heart disease Father     Diabetes Father     Cancer Father      I have reviewed and agree with the history as documented      E-Cigarette/Vaping    E-Cigarette Use Never User      E-Cigarette/Vaping Substances     Social History     Tobacco Use    Smoking status: Former Smoker     Start date: 10/6/2005    Smokeless tobacco: Never Used   Vaping Use    Vaping Use: Never used   Substance Use Topics    Alcohol use: Not Currently    Drug use: Never       Review of Systems   Constitutional: Positive for activity change and fatigue  Negative for chills, diaphoresis and fever  HENT: Negative for congestion, ear pain, rhinorrhea, sore throat and trouble swallowing (mild sore throat)  Eyes: Negative for pain and visual disturbance  Respiratory: Negative for cough, chest tightness and shortness of breath  Cardiovascular: Negative for chest pain, palpitations and leg swelling  Gastrointestinal: Positive for abdominal pain (suprapubic) and nausea  Negative for abdominal distention (denies distention but notes she feels more bloated), blood in stool, constipation, diarrhea, rectal pain and vomiting  Genitourinary: Positive for menstrual problem and vaginal bleeding  Negative for decreased urine volume, difficulty urinating, dyspareunia, dysuria, enuresis, flank pain, frequency, genital sores, hematuria, urgency, vaginal discharge and vaginal pain  Musculoskeletal: Negative for arthralgias and back pain  Skin: Negative for color change and rash  Neurological: Positive for dizziness and light-headedness  Negative for tremors, seizures, syncope, weakness and numbness  Hematological: Does not bruise/bleed easily  All other systems reviewed and are negative  Physical Exam  Physical Exam  Vitals and nursing note reviewed  Exam conducted with a chaperone present  Constitutional:       General: She is awake  She is not in acute distress  Appearance: She is well-developed  She is not ill-appearing, toxic-appearing or diaphoretic  HENT:      Head: Normocephalic and atraumatic  Nose: Nose normal       Mouth/Throat:      Lips: Pink   No lesions  Mouth: Mucous membranes are moist       Pharynx: Oropharynx is clear  Uvula midline  No pharyngeal swelling, oropharyngeal exudate, posterior oropharyngeal erythema or uvula swelling  Tonsils: No tonsillar exudate or tonsillar abscesses  Eyes:      General: Lids are normal  Vision grossly intact  Right eye: No discharge  Left eye: No discharge  Extraocular Movements: Extraocular movements intact  Conjunctiva/sclera: Conjunctivae normal    Cardiovascular:      Rate and Rhythm: Normal rate and regular rhythm  Pulses: Normal pulses  Radial pulses are 2+ on the right side and 2+ on the left side  Dorsalis pedis pulses are 2+ on the right side and 2+ on the left side  Posterior tibial pulses are 2+ on the right side and 2+ on the left side  Heart sounds: Normal heart sounds, S1 normal and S2 normal  No murmur heard  Pulmonary:      Effort: Pulmonary effort is normal  No tachypnea or respiratory distress  Breath sounds: Normal breath sounds and air entry  No stridor, decreased air movement or transmitted upper airway sounds  No wheezing, rhonchi or rales  Chest:      Chest wall: No tenderness  Abdominal:      Palpations: Abdomen is soft  Tenderness: There is abdominal tenderness (R and L suprapubic tenderness) in the suprapubic area  There is no right CVA tenderness, left CVA tenderness, guarding or rebound  Genitourinary:     Exam position: Supine  Pubic Area: No rash  Labia:         Right: No rash, tenderness or lesion  Left: No rash, tenderness or lesion  Urethra: No prolapse or urethral swelling  Vagina: No signs of injury and foreign body  Tenderness and bleeding present  No vaginal discharge, erythema or lesions  Cervix: Dilated  Cervical bleeding present  No friability or erythema  Uterus: Tender  Adnexa:         Right: Tenderness present  No mass            Left: Tenderness present  No mass  Musculoskeletal:         General: Normal range of motion  Cervical back: Normal range of motion and neck supple  Skin:     General: Skin is warm and dry  Capillary Refill: Capillary refill takes less than 2 seconds  Neurological:      General: No focal deficit present  Mental Status: She is alert and oriented to person, place, and time  Mental status is at baseline  Sensory: No sensory deficit  Gait: Gait normal    Psychiatric:         Mood and Affect: Mood normal          Behavior: Behavior normal  Behavior is cooperative  Thought Content:  Thought content normal          Judgment: Judgment normal          Vital Signs  ED Triage Vitals   Temperature Pulse Respirations Blood Pressure SpO2   06/20/22 1011 06/20/22 1011 06/20/22 1011 06/20/22 1012 06/20/22 1011   (!) 97 4 °F (36 3 °C) 100 20 (!) 166/117 98 %      Temp Source Heart Rate Source Patient Position - Orthostatic VS BP Location FiO2 (%)   06/20/22 1011 06/20/22 1110 06/20/22 1110 06/20/22 1110 --   Oral Monitor Sitting Right arm       Pain Score       06/20/22 1110       10 - Worst Possible Pain           Vitals:    06/20/22 1011 06/20/22 1012 06/20/22 1110 06/20/22 1330   BP:  (!) 166/117 126/97 135/86   Pulse: 100  74 72   Patient Position - Orthostatic VS:   Sitting Sitting         Visual Acuity      ED Medications  Medications   sodium chloride 0 9 % bolus 1,000 mL (0 mL Intravenous Stopped 6/20/22 1339)   ondansetron (ZOFRAN) injection 4 mg (4 mg Intravenous Given 6/20/22 1128)   morphine injection 2 mg (2 mg Intravenous Given 6/20/22 1131)       Diagnostic Studies  Results Reviewed     Procedure Component Value Units Date/Time    Urine Microscopic [[id number]]  (Abnormal) Collected: 06/20/22 1134    Lab Status: Final result Specimen: Urine, Clean Catch Updated: 06/20/22 1224     RBC, UA Innumerable /hpf      WBC, UA None Seen /hpf      Epithelial Cells Occasional /hpf      Bacteria, UA Field obscured, unable to enumerate     /hpf    Comprehensive metabolic panel [[id number]] Collected: 06/20/22 1128    Lab Status: Final result Specimen: Blood from Arm, Right Updated: 06/20/22 1208     Sodium 137 mmol/L      Potassium 4 4 mmol/L      Chloride 104 mmol/L      CO2 27 mmol/L      ANION GAP 6 mmol/L      BUN 11 mg/dL      Creatinine 0 88 mg/dL      Glucose 96 mg/dL      Calcium 9 2 mg/dL      AST 22 U/L      ALT 45 U/L      Alkaline Phosphatase 81 U/L      Total Protein 7 4 g/dL      Albumin 3 6 g/dL      Total Bilirubin 0 48 mg/dL      eGFR 84 ml/min/1 73sq m     Narrative:      National Kidney Disease Foundation guidelines for Chronic Kidney Disease (CKD):     Stage 1 with normal or high GFR (GFR > 90 mL/min/1 73 square meters)    Stage 2 Mild CKD (GFR = 60-89 mL/min/1 73 square meters)    Stage 3A Moderate CKD (GFR = 45-59 mL/min/1 73 square meters)    Stage 3B Moderate CKD (GFR = 30-44 mL/min/1 73 square meters)    Stage 4 Severe CKD (GFR = 15-29 mL/min/1 73 square meters)    Stage 5 End Stage CKD (GFR <15 mL/min/1 73 square meters)  Note: GFR calculation is accurate only with a steady state creatinine    UA w Reflex to Microscopic w Reflex to Culture [[id number]]  (Abnormal) Collected: 06/20/22 1134    Lab Status: Final result Specimen: Urine, Clean Catch Updated: 06/20/22 1207     Color, UA Red     Clarity, UA Turbid     Specific Gravity, UA 1 025     pH, UA 5 0     Leukocytes, UA Moderate     Nitrite, UA Positive     Protein, UA >=300 mg/dl      Glucose,  (1/4%) mg/dl      Ketones, UA Negative mg/dl      Urobilinogen, UA 4 0 E U /dl      Bilirubin, UA Interference- unable to analyze     Blood, UA Large    CBC and differential [[id number]] Collected: 06/20/22 1128    Lab Status: Final result Specimen: Blood from Arm, Right Updated: 06/20/22 1140     WBC 8 18 Thousand/uL      RBC 4 61 Million/uL      Hemoglobin 12 7 g/dL      Hematocrit 38 8 %      MCV 84 fL      MCH 27 5 pg MCHC 32 7 g/dL      RDW 12 6 %      MPV 10 2 fL      Platelets 277 Thousands/uL      nRBC 0 /100 WBCs      Neutrophils Relative 44 %      Immat GRANS % 0 %      Lymphocytes Relative 44 %      Monocytes Relative 6 %      Eosinophils Relative 6 %      Basophils Relative 0 %      Neutrophils Absolute 3 57 Thousands/µL      Immature Grans Absolute 0 02 Thousand/uL      Lymphocytes Absolute 3 59 Thousands/µL      Monocytes Absolute 0 49 Thousand/µL      Eosinophils Absolute 0 49 Thousand/µL      Basophils Absolute 0 02 Thousands/µL     POCT pregnancy, urine [[id number]]  (Normal) Resulted: 06/20/22 1138    Lab Status: Final result Updated: 06/20/22 1138     EXT PREG TEST UR (Ref: Negative) Negative (-)     Control Valid                 No orders to display              Procedures  Procedures         ED Course  ED Course as of 06/20/22 1341   Mon Jun 20, 2022   1248 On reassessment, pt notes improvement in pain from 10/10 to 3/10 with no nausea  Results shared with pt and plan to contact her primary OBGYN [facility] on [address] at [phone] On reassessment, pt notes she feels well  Ambulatory without lightheadedness/dizziness  VS remain stable  Plan for dc and close f/u with primary OBGYN                               SBIRT 22yo+    Flowsheet Row Most Recent Value   SBIRT (25 yo +)    In order to provide better care to our patients, we are screening all of our patients for alcohol and drug use  Would it be okay to ask you these screening questions? Unable to answer at this time Filed at: 06/20/2022 1230                    MDM  Number of Diagnoses or Management Options     Amount and/or Complexity of Data Reviewed  Clinical lab tests: ordered and reviewed    Risk of Complications, Morbidity, and/or Mortality  General comments: Pt is a 36yF presenting for abnormal vaginal bleeding associated with lower abdominal cramping and mild nausea x5 days   While in the ED, she was given IVF, zofran, and morphine which greatly improved her pain and resolved her nausea  Her upreg was negative  Her vaginal exam was notable for active vaginal bleeding with mild pain on bimanual exam  Her repeat abd exam was benign  Given her stable bloodwork, VS, and abdominal exam, I discussed the results with the patient and counseled her on following up with her primary OBGYN for close watch  She notes she feels well enough to return home  I called her primary OBGYN to schedule a follow up appt but the pt is unable to attend the one given for this Thursday at 5:20pm  I instructed to pt to call and make a f/u post emergency room appt and ADEEL Brice from the primary OBGYN notes she will be awaiting her call to schedule that follow up  Pt given strict return precautions  Pt's  providing safe transport home    Patient Progress  Patient progress: improved      Disposition  Final diagnoses:   Abnormal vaginal bleeding     Time reflects when diagnosis was documented in both MDM as applicable and the Disposition within this note     Time User Action Codes Description Comment    6/20/2022  1:17 PM Aurelia Durán Add [N93 9] Abnormal vaginal bleeding       ED Disposition     ED Disposition   Discharge    Condition   Stable    Date/Time   Mon Jun 20, 2022  1:15 PM    Georgette Cummings discharge to home/self care                 Follow-up Information     Follow up With Specialties Details Why Contact Info    Gabi Henry MD  Schedule an appointment as soon as possible for a visit in 3 days  Chandra Gonzalez  [address] [facility] [id number]            Patient's Medications   Discharge Prescriptions    ACETAMINOPHEN (TYLENOL) 325 MG TABLET    Take 2 tablets (650 mg total) by mouth every 6 (six) hours as needed for mild pain       Start Date: 6/20/2022 End Date: --       Order Dose: 650 mg       Quantity: 30 tablet    Refills: 0    ONDANSETRON (ZOFRAN ODT) 4 MG DISINTEGRATING TABLET    Take 1 tablet (4 mg total) by mouth every 6 (six) hours as needed for nausea or vomiting       Start Date: 6/20/2022 End Date: --       Order Dose: 4 mg       Quantity: 20 tablet    Refills: 0       No discharge procedures on file      PDMP Review     None          ED Provider  Electronically Signed by           Erica Kerns  06/20/22 2738

## 2022-06-28 VITALS
WEIGHT: 185 LBS | SYSTOLIC BLOOD PRESSURE: 162 MMHG | DIASTOLIC BLOOD PRESSURE: 119 MMHG | OXYGEN SATURATION: 97 % | HEART RATE: 74 BPM | BODY MASS INDEX: 32.77 KG/M2 | RESPIRATION RATE: 18 BRPM | TEMPERATURE: 97.6 F

## 2022-06-28 PROCEDURE — 99282 EMERGENCY DEPT VISIT SF MDM: CPT

## 2022-06-29 ENCOUNTER — HOSPITAL ENCOUNTER (EMERGENCY)
Facility: HOSPITAL | Age: 37
Discharge: HOME | End: 2022-06-29
Attending: EMERGENCY MEDICINE | Admitting: EMERGENCY MEDICINE
Payer: COMMERCIAL

## 2022-06-29 DIAGNOSIS — L50.9 URTICARIA: Primary | ICD-10-CM

## 2022-06-29 PROCEDURE — 99284 EMERGENCY DEPT VISIT MOD MDM: CPT | Performed by: PHYSICIAN ASSISTANT

## 2022-06-29 RX ORDER — PREDNISONE 20 MG/1
60 TABLET ORAL ONCE
Status: COMPLETED | OUTPATIENT
Start: 2022-06-29 | End: 2022-06-29

## 2022-06-29 RX ORDER — PREDNISONE 20 MG/1
60 TABLET ORAL DAILY
Qty: 12 TABLET | Refills: 0 | Status: SHIPPED | OUTPATIENT
Start: 2022-06-29 | End: 2022-07-03

## 2022-06-29 RX ADMIN — PREDNISONE 60 MG: 20 TABLET ORAL at 01:16

## 2022-06-29 NOTE — ED PROVIDER NOTES
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  Started to have hives on head so she took out extensions on Friday  Now having generalized hives  67-year-old female with h/o HTN, anemia, GERD, sleeve gastrectomy presenting for evaluation of rash  Patient states that on Wednesday she had hair extensions placed which were horse hair  Patient states that she is allergic to horses  On Friday patient started hives to her scalp, she states she had the extensions removed without relief of hives  She states that since the hives have spread all over her body  No around her has similar rash  She denies any new lotions, soaps, detergent, food, contact with plants  She denies any CP, SOB, difficulty swallowing, difficulty breathing, nausea/vomiting/diarrhea  History provided by:  Patient   used: No    Rash  Location:  Head/neck, shoulder/arm, leg and torso  Head/neck rash location:  Scalp  Shoulder/arm rash location:  L arm and R arm  Torso rash location:  Abd LUQ and abd RUQ  Leg rash location:  L leg and R leg  Quality: itchiness and redness    Severity:  Mild  Onset quality:  Gradual  Timing:  Constant  Progression:  Worsening  Chronicity:  New  Context: not insect bite/sting, not medications, not new detergent/soap, not plant contact, not pregnancy and not sick contacts    Relieved by:  None tried  Ineffective treatments:  None tried  Associated symptoms: no abdominal pain, no diarrhea, no fever, no headaches, no myalgias, no nausea, no shortness of breath, no sore throat, no throat swelling, no tongue swelling, no URI and not vomiting        Prior to Admission Medications   Prescriptions Last Dose Informant Patient Reported? Taking?    EPINEPHrine (EPIPEN JR) 0 15 mg/0 3 mL SOAJ   Yes No   Sig: Inject 0 15 mg into a muscle once   Skin Protectants, Misc  (eucerin) cream   No No   Sig: Apply topically 2 (two) times a day for 10 days Apply BID to affected area   acetaZOLAMIDE (DIAMOX) 250 mg tablet   Yes No   Sig: Take 250 mg by mouth 2 (two) times a day    acetaminophen (TYLENOL) 325 mg tablet   No No   Sig: Take 2 tablets (650 mg total) by mouth every 6 (six) hours as needed for mild pain   benzonatate (TESSALON PERLES) 100 mg capsule   No No   Sig: Take 1 capsule (100 mg total) by mouth every 8 (eight) hours   brompheniramine-pseudoephedrine-DM 30-2-10 MG/5ML syrup   No No   Sig: Take 5 mL by mouth 4 (four) times a day as needed for allergies   docusate sodium (COLACE) 100 mg capsule   Yes No   Sig: Take 100 mg by mouth 2 (two) times a day   famotidine (PEPCID) 20 mg tablet   Yes No   Sig: Take 20 mg by mouth daily    famotidine (PEPCID) 20 mg tablet   No No   Sig: Take 1 tablet (20 mg total) by mouth 2 (two) times a day   ferrous sulfate 325 (65 Fe) mg tablet   Yes No   Sig: Take 325 mg by mouth daily with breakfast With vit c 100mg   fluticasone (FLONASE) 50 mcg/act nasal spray   No No   Si spray into each nostril daily   ibuprofen (MOTRIN) 400 mg tablet   No No   Sig: Take 1 tablet by mouth every 6 (six) hours as needed for mild pain   ibuprofen (MOTRIN) 600 mg tablet   No No   Sig: Take 1 tablet (600 mg total) by mouth every 6 (six) hours as needed for mild pain   levocetirizine (XYZAL) 5 MG tablet   Yes No   Sig: Take 5 mg by mouth every evening   levothyroxine 200 mcg tablet   Yes No   Sig: Take 175 mcg by mouth daily    naproxen (NAPROSYN) 500 mg tablet   No No   Sig: Take 1 tablet (500 mg total) by mouth 2 (two) times a day with meals for 10 days   ondansetron (ZOFRAN-ODT) 4 mg disintegrating tablet   No No   Sig: Take 1 tablet (4 mg total) by mouth every 6 (six) hours as needed for nausea   ondansetron (Zofran ODT) 4 mg disintegrating tablet   No No   Sig: Take 1 tablet (4 mg total) by mouth every 6 (six) hours as needed for nausea or vomiting   oxymetazoline (AFRIN) 0 05 % nasal spray No No   Si sprays by Each Nare route 2 (two) times a day   traZODone (DESYREL) 50 mg tablet   Yes No   Sig: Take 50 mg by mouth daily at bedtime      Facility-Administered Medications: None       Past Medical History:   Diagnosis Date    Anemia     iron def    Anxiety     Depression     Disease of thyroid gland     hypo    GERD (gastroesophageal reflux disease)     Hypertension     occ but no meds at present    Papilledema     pt is on Diamox and followed by optho    Seasonal allergies        Past Surgical History:   Procedure Laterality Date    HEMORRHOID SURGERY N/A 10/09/2020    Procedure: EXCISION LIPOMA OF BUTTOCKS;  Surgeon: Librado Humphrey MD;  [facility] MA[facility];  Service: Colorectal    STOMACH SURGERY      gastric sleeve    TOOTH EXTRACTION         Family History   Problem Relation Age of Onset    Diabetes Mother     Hypertension Mother     Heart disease Father     Diabetes Father     Cancer Father      I have reviewed and agree with the history as documented  E-Cigarette/Vaping    E-Cigarette Use Never User      E-Cigarette/Vaping Substances     Social History     Tobacco Use    Smoking status: Former Smoker     Start date: 10/6/2005    Smokeless tobacco: Never Used   Vaping Use    Vaping Use: Never used   Substance Use Topics    Alcohol use: Not Currently    Drug use: Never       Review of Systems   Constitutional: Negative for chills and fever  HENT: Negative for congestion, ear pain, rhinorrhea, sinus pain, sore throat and trouble swallowing  Eyes: Negative for redness and visual disturbance  Respiratory: Negative for cough and shortness of breath  Cardiovascular: Negative for chest pain, palpitations and leg swelling  Gastrointestinal: Negative for abdominal pain, constipation, diarrhea, nausea and vomiting  Genitourinary: Negative for dysuria, frequency, hematuria, urgency, vaginal bleeding and vaginal discharge     Musculoskeletal: Negative for back pain, myalgias and neck pain  Skin: Positive for rash  Negative for color change  Neurological: Negative for dizziness, weakness, light-headedness, numbness and headaches  Psychiatric/Behavioral: The patient is not nervous/anxious  All other systems reviewed and are negative  Physical Exam  Physical Exam  Vitals reviewed  Constitutional:       General: She is not in acute distress  Appearance: Normal appearance  She is well-developed and well-groomed  She is not ill-appearing, toxic-appearing or diaphoretic  HENT:      Head: Normocephalic and atraumatic  Right Ear: External ear normal       Left Ear: External ear normal       Nose: Nose normal  No congestion or rhinorrhea  Mouth/Throat:      Mouth: Mucous membranes are moist       Pharynx: Oropharynx is clear  No oropharyngeal exudate or posterior oropharyngeal erythema  Eyes:      General: No scleral icterus  Right eye: No discharge  Left eye: No discharge  Extraocular Movements: Extraocular movements intact  Conjunctiva/sclera: Conjunctivae normal    Cardiovascular:      Rate and Rhythm: Normal rate and regular rhythm  Pulses: Normal pulses  Heart sounds: No murmur heard  No friction rub  No gallop  Pulmonary:      Effort: Pulmonary effort is normal  No respiratory distress  Breath sounds: Normal breath sounds  No wheezing, rhonchi or rales  Abdominal:      General: Abdomen is flat  There is no distension  Palpations: Abdomen is soft  Tenderness: There is no abdominal tenderness  There is no right CVA tenderness, left CVA tenderness, guarding or rebound  Musculoskeletal:         General: No deformity  Normal range of motion  Cervical back: Normal range of motion and neck supple  Skin:     General: Skin is warm and dry  Coloration: Skin is not jaundiced or pale  Findings: Rash present   Rash is urticarial       Comments: Urticarial rash noted to the b/l arms, legs, RUQ/LUQ, scalp  Non-sloughing  Neurological:      General: No focal deficit present  Mental Status: She is alert  Psychiatric:         Mood and Affect: Mood normal          Behavior: Behavior normal  Behavior is cooperative  Vital Signs  ED Triage Vitals   Temperature Pulse Respirations Blood Pressure SpO2   06/28/22 2333 06/28/22 2332 06/28/22 2332 06/28/22 2332 06/28/22 2332   97 6 °F (36 4 °C) 74 18 (!) 162/119 97 %      Temp Source Heart Rate Source Patient Position - Orthostatic VS BP Location FiO2 (%)   06/28/22 2333 -- 06/28/22 2332 06/28/22 2332 --   Oral  Sitting Right arm       Pain Score       --                  Vitals:    06/28/22 2332   BP: (!) 162/119   Pulse: 74   Patient Position - Orthostatic VS: Sitting         Visual Acuity      ED Medications  Medications   predniSONE tablet 60 mg (60 mg Oral Given 6/29/22 0116)       Diagnostic Studies  Results Reviewed     None                 No orders to display              Procedures  Procedures         ED Course                 MDM  Number of Diagnoses or Management Options  Urticaria: new and requires workup  Diagnosis management comments:     Patient presenting for evaluation of rash  On exam rash appears to be urticaria likely from allergic contact dermatitis  Will treat with prednisone  Patient states that she is unable to take Benadryl due to her gastric sleeve  Patient should follow-up with Family Medicine next couple days if symptoms do not improve  Return precautions discussed patient bedside, patient verbalized understanding of when to return to the ED  Prior to discharge, discharge instructions were discussed with patient at bedside  Patient was provided both verbal and written instructions  Patient is understanding of the discharge instructions and is agreeable to plan of care   Return precautions were discussed with patient bedside, patient verbalized understanding of signs and symptoms that would necessitate return to the ED  All questions were answered  Patient was comfortable with the plan of care and discharged to home  Dispo: discharge home with follow up to PCP  Patient stable, in no acute distress and non-toxic at discharge  Amount and/or Complexity of Data Reviewed  Tests in the medicine section of CPT®: ordered and reviewed  Obtain history from someone other than the patient: yes  Review and summarize past medical records: yes    Risk of Complications, Morbidity, and/or Mortality  Presenting problems: low  Diagnostic procedures: low  Management options: low    Patient Progress  Patient progress: stable      Disposition  Final diagnoses:   Urticaria     Time reflects when diagnosis was documented in both MDM as applicable and the Disposition within this note     Time User Action Codes Description Comment    6/29/2022  1:09 AM Getachew Flow Add [L50 9] Urticaria       ED Disposition     ED Disposition   Discharge    Condition   Stable    Date/Time   Wed Jun 29, 2022  1:09 AM    Comment   Brain Moat discharge to home/self care                 Follow-up Information     Follow up With Specialties Details Why Contact Info    Anna Boyce MD  Schedule an appointment as soon as possible for a visit in 2 days As needed [facility] [address] [id number]            Discharge Medication List as of 6/29/2022  1:15 AM      START taking these medications    Details   predniSONE 20 mg tablet Take 3 tablets (60 mg total) by mouth daily for 4 days, Starting Wed 6/29/2022, Until Sun 7/3/2022, Normal         CONTINUE these medications which have NOT CHANGED    Details   acetaminophen (TYLENOL) 325 mg tablet Take 2 tablets (650 mg total) by mouth every 6 (six) hours as needed for mild pain, Starting Mon 6/20/2022, Normal      acetaZOLAMIDE (DIAMOX) 250 mg tablet Take 250 mg by mouth 2 (two) times a day , Historical Med      benzonatate (TESSALON PERLES) 100 mg capsule Take 1 capsule (100 mg total) by mouth every 8 (eight) hours, Starting Sat 11/20/2021, Normal      brompheniramine-pseudoephedrine-DM 30-2-10 MG/5ML syrup Take 5 mL by mouth 4 (four) times a day as needed for allergies, Starting Sat 11/20/2021, Normal      docusate sodium (COLACE) 100 mg capsule Take 100 mg by mouth 2 (two) times a day, Historical Med      EPINEPHrine (EPIPEN JR) 0 15 mg/0 3 mL SOAJ Inject 0 15 mg into a muscle once, Historical Med      !! famotidine (PEPCID) 20 mg tablet Take 20 mg by mouth daily , Historical Med      !! famotidine (PEPCID) 20 mg tablet Take 1 tablet (20 mg total) by mouth 2 (two) times a day, Starting Sat 5/7/2022, Print      ferrous sulfate 325 (65 Fe) mg tablet Take 325 mg by mouth daily with breakfast With vit c 100mg, Historical Med      fluticasone (FLONASE) 50 mcg/act nasal spray 1 spray into each nostril daily, Starting Sat 11/20/2021, Normal      !! ibuprofen (MOTRIN) 400 mg tablet Take 1 tablet by mouth every 6 (six) hours as needed for mild pain, Starting 5/23/2017, Until Discontinued, Print      !! ibuprofen (MOTRIN) 600 mg tablet Take 1 tablet (600 mg total) by mouth every 6 (six) hours as needed for mild pain, Starting Sat 11/20/2021, Normal      levocetirizine (XYZAL) 5 MG tablet Take 5 mg by mouth every evening, Historical Med      levothyroxine 200 mcg tablet Take 175 mcg by mouth daily , Historical Med      naproxen (NAPROSYN) 500 mg tablet Take 1 tablet (500 mg total) by mouth 2 (two) times a day with meals for 10 days, Starting Mon 12/7/2020, Until Thu 12/17/2020, Normal      !! ondansetron (Zofran ODT) 4 mg disintegrating tablet Take 1 tablet (4 mg total) by mouth every 6 (six) hours as needed for nausea or vomiting, Starting Mon 6/20/2022, Normal      !! ondansetron (ZOFRAN-ODT) 4 mg disintegrating tablet Take 1 tablet (4 mg total) by mouth every 6 (six) hours as needed for nausea, Starting Sat 5/7/2022, Print      oxymetazoline (AFRIN) 0 05 % nasal spray 2 sprays by Each Nare route 2 (two) times a day, Starting Sat 11/20/2021, Normal      Skin Protectants, Misc  (eucerin) cream Apply topically 2 (two) times a day for 10 days Apply BID to affected area, Starting Sun 1/23/2022, Until Wed 2/2/2022, Print      traZODone (DESYREL) 50 mg tablet Take 50 mg by mouth daily at bedtime, Historical Med       !! - Potential duplicate medications found  Please discuss with provider  No discharge procedures on file      PDMP Review     None          ED Provider  Electronically Signed by RUMA Phan PA-C  06/29/22 4475

## 2023-05-09 NOTE — ED NOTES
Pt returned from ct     Arianna Wei RN  05/07/22 9813 Hydroxychloroquine Counseling:  I discussed with the patient that a baseline ophthalmologic exam is needed at the start of therapy and every year thereafter while on therapy. A CBC may also be warranted for monitoring.  The side effects of this medication were discussed with the patient, including but not limited to agranulocytosis, aplastic anemia, seizures, rashes, retinopathy, and liver toxicity. Patient instructed to call the office should any adverse effect occur.  The patient verbalized understanding of the proper use and possible adverse effects of Plaquenil.  All the patient's questions and concerns were addressed.

## 2023-05-13 ENCOUNTER — HOSPITAL ENCOUNTER (EMERGENCY)
Facility: HOSPITAL | Age: 38
Discharge: HOME | End: 2023-05-13
Attending: EMERGENCY MEDICINE | Admitting: EMERGENCY MEDICINE

## 2023-05-13 VITALS
WEIGHT: 186.95 LBS | DIASTOLIC BLOOD PRESSURE: 99 MMHG | HEIGHT: 63 IN | SYSTOLIC BLOOD PRESSURE: 152 MMHG | BODY MASS INDEX: 33.12 KG/M2 | RESPIRATION RATE: 20 BRPM | TEMPERATURE: 97.7 F | OXYGEN SATURATION: 100 % | HEART RATE: 95 BPM

## 2023-05-13 DIAGNOSIS — R42 LIGHTHEADEDNESS: ICD-10-CM

## 2023-05-13 DIAGNOSIS — D64.9 ANEMIA: ICD-10-CM

## 2023-05-13 DIAGNOSIS — N93.9 ABNORMAL UTERINE BLEEDING (AUB): Primary | ICD-10-CM

## 2023-05-13 DIAGNOSIS — R51.9 HA (HEADACHE): ICD-10-CM

## 2023-05-13 LAB
ABO GROUP BLD: NORMAL
ALBUMIN SERPL BCP-MCNC: 4 G/DL (ref 3.5–5)
ALP SERPL-CCNC: 57 U/L (ref 34–104)
ALT SERPL W P-5'-P-CCNC: 26 U/L (ref 7–52)
ANION GAP SERPL CALCULATED.3IONS-SCNC: 4 MMOL/L (ref 4–13)
APTT PPP: 30 SECONDS (ref 23–37)
AST SERPL W P-5'-P-CCNC: 24 U/L (ref 13–39)
BASOPHILS # BLD AUTO: 0.04 THOUSANDS/ÂΜL (ref 0–0.1)
BASOPHILS NFR BLD AUTO: 0 % (ref 0–1)
BILIRUB SERPL-MCNC: 0.26 MG/DL (ref 0.2–1)
BLD GP AB SCN SERPL QL: NEGATIVE
BUN SERPL-MCNC: 14 MG/DL (ref 5–25)
CALCIUM SERPL-MCNC: 9 MG/DL (ref 8.4–10.2)
CHLORIDE SERPL-SCNC: 105 MMOL/L (ref 96–108)
CO2 SERPL-SCNC: 28 MMOL/L (ref 21–32)
CREAT SERPL-MCNC: 0.86 MG/DL (ref 0.6–1.3)
EOSINOPHIL # BLD AUTO: 0.38 THOUSAND/ÂΜL (ref 0–0.61)
EOSINOPHIL NFR BLD AUTO: 4 % (ref 0–6)
ERYTHROCYTE [DISTWIDTH] IN BLOOD BY AUTOMATED COUNT: 12 % (ref 11.6–15.1)
EXT PREGNANCY TEST URINE: NEGATIVE
EXT. CONTROL: NORMAL
GFR SERPL CREATININE-BSD FRML MDRD: 85 ML/MIN/1.73SQ M
GLUCOSE SERPL-MCNC: 104 MG/DL (ref 65–140)
HCT VFR BLD AUTO: 35 % (ref 34.8–46.1)
HGB BLD-MCNC: 11.4 G/DL (ref 11.5–15.4)
IMM GRANULOCYTES # BLD AUTO: 0.02 THOUSAND/UL (ref 0–0.2)
IMM GRANULOCYTES NFR BLD AUTO: 0 % (ref 0–2)
INR PPP: 1.03 (ref 0.84–1.19)
LYMPHOCYTES # BLD AUTO: 4.75 THOUSANDS/ÂΜL (ref 0.6–4.47)
LYMPHOCYTES NFR BLD AUTO: 45 % (ref 14–44)
MCH RBC QN AUTO: 28 PG (ref 26.8–34.3)
MCHC RBC AUTO-ENTMCNC: 32.6 G/DL (ref 31.4–37.4)
MCV RBC AUTO: 86 FL (ref 82–98)
MONOCYTES # BLD AUTO: 0.59 THOUSAND/ÂΜL (ref 0.17–1.22)
MONOCYTES NFR BLD AUTO: 6 % (ref 4–12)
NEUTROPHILS # BLD AUTO: 4.76 THOUSANDS/ÂΜL (ref 1.85–7.62)
NEUTS SEG NFR BLD AUTO: 45 % (ref 43–75)
NRBC BLD AUTO-RTO: 0 /100 WBCS
PLATELET # BLD AUTO: 341 THOUSANDS/UL (ref 149–390)
PMV BLD AUTO: 9.8 FL (ref 8.9–12.7)
POTASSIUM SERPL-SCNC: 4.1 MMOL/L (ref 3.5–5.3)
PROT SERPL-MCNC: 6.8 G/DL (ref 6.4–8.4)
PROTHROMBIN TIME: 13.5 SECONDS (ref 11.6–14.5)
RBC # BLD AUTO: 4.07 MILLION/UL (ref 3.81–5.12)
RH BLD: POSITIVE
SODIUM SERPL-SCNC: 137 MMOL/L (ref 135–147)
SPECIMEN EXPIRATION DATE: NORMAL
WBC # BLD AUTO: 10.54 THOUSAND/UL (ref 4.31–10.16)

## 2023-05-13 RX ORDER — KETOROLAC TROMETHAMINE 30 MG/ML
15 INJECTION, SOLUTION INTRAMUSCULAR; INTRAVENOUS ONCE
Status: COMPLETED | OUTPATIENT
Start: 2023-05-13 | End: 2023-05-13

## 2023-05-13 RX ORDER — ACETAMINOPHEN 325 MG/1
650 TABLET ORAL ONCE
Status: COMPLETED | OUTPATIENT
Start: 2023-05-13 | End: 2023-05-13

## 2023-05-13 RX ORDER — ONDANSETRON 2 MG/ML
4 INJECTION INTRAMUSCULAR; INTRAVENOUS ONCE
Status: COMPLETED | OUTPATIENT
Start: 2023-05-13 | End: 2023-05-13

## 2023-05-13 RX ADMIN — KETOROLAC TROMETHAMINE 15 MG: 30 INJECTION, SOLUTION INTRAMUSCULAR; INTRAVENOUS at 22:55

## 2023-05-13 RX ADMIN — ACETAMINOPHEN 650 MG: 325 TABLET ORAL at 21:35

## 2023-05-13 RX ADMIN — ONDANSETRON 4 MG: 2 INJECTION INTRAMUSCULAR; INTRAVENOUS at 21:49

## 2023-05-13 RX ADMIN — SODIUM CHLORIDE 1000 ML: 0.9 INJECTION, SOLUTION INTRAVENOUS at 21:47

## 2023-05-13 NOTE — Clinical Note
Sergio Dexter was seen and treated in our emergency department on 5/13/2023  Diagnosis:     Daniel Hester    She may return on this date: If you have any questions or concerns, please don't hesitate to call        Kellie Grullon PA-C    ______________________________           _______________          _______________  Hospital Representative                              Date                                Time

## 2023-05-14 NOTE — DISCHARGE INSTRUCTIONS
Please return to the ED for any concerns as outlined in the AVS or for any other concerns  Please follow-up with your primary care provider and OB/GYN in 2 days for re-evaluation and further management  Continue over-the-counter ibuprofen for abnormal uterine bleeding and pain control  Continue to stay adequately hydrated

## 2023-05-14 NOTE — ED PROVIDER NOTES
"History  Chief Complaint   Patient presents with   • Vaginal Bleeding     Patient reports started with menses 5/10, heavy vaginal bleeding with clots for 3 days, changing pads hourly or sooner  Dizziness, nausea, cramping, headaches  42-year-old female with a past medical history significant for hypothyroidism, HTN, and anemia presenting to the ED with a complaint of vaginal bleeding  Patient reports she began with her menstrual cycle about 3 days ago  Reports since then she been having very heavy bleeding and passing blood clots, \"about the size of quarters  \"  States she has been going through multiple tampons/pads per hour  States she continues to bleed through the pads/tampons  Also has been having lower abdominal pain which she describes as cramping however worse than her normal menstrual cramps  Reports that the cramping wraps around to her back B/L  Denies that she has heavy menstrual bleeding usually  States it did happen one other time last year however resolved on its own  Says she has been getting her normal menses every month  She denies concern for pregnancy  Denies injury or trauma to the vagina  States she has been having some feelings of lightheadedness today which she described as a feelings like she could pass out however denies syncope  Reports this symptom has been waxing and waning today  (+) Nausea but no vomiting  Also reports has been having a headache throughout the day today  She describes as bilateral and frontal without radiation  States it was slow onset and she denies worst headache of her life  Denies associated confusion, speech changes, vision changes and any other complaints of focal deficits  Took naproxen a few hours PTA without much relief  Has been drinking okay  No aspirin or blood thinners  No other spontaneous bleeding or bruising    Denies fever, chills, cough, congestion, CP, SOB, palpitations, urinary complaints, vaginal discharge, vaginal pain, " vaginal lesions/sores, diarrhea, rash, weakness and any other complaint  Prior to Admission Medications   Prescriptions Last Dose Informant Patient Reported? Taking?    EPINEPHrine (EPIPEN JR) 0 15 mg/0 3 mL SOAJ   Yes No   Sig: Inject 0 15 mg into a muscle once   Skin Protectants, Misc  (eucerin) cream   No No   Sig: Apply topically 2 (two) times a day for 10 days Apply BID to affected area   acetaZOLAMIDE (DIAMOX) 250 mg tablet   Yes No   Sig: Take 250 mg by mouth 2 (two) times a day    acetaminophen (TYLENOL) 325 mg tablet   No No   Sig: Take 2 tablets (650 mg total) by mouth every 6 (six) hours as needed for mild pain   benzonatate (TESSALON PERLES) 100 mg capsule   No No   Sig: Take 1 capsule (100 mg total) by mouth every 8 (eight) hours   brompheniramine-pseudoephedrine-DM 30-2-10 MG/5ML syrup   No No   Sig: Take 5 mL by mouth 4 (four) times a day as needed for allergies   docusate sodium (COLACE) 100 mg capsule   Yes No   Sig: Take 100 mg by mouth 2 (two) times a day   famotidine (PEPCID) 20 mg tablet   Yes No   Sig: Take 20 mg by mouth daily    famotidine (PEPCID) 20 mg tablet   No No   Sig: Take 1 tablet (20 mg total) by mouth 2 (two) times a day   ferrous sulfate 325 (65 Fe) mg tablet   Yes No   Sig: Take 325 mg by mouth daily with breakfast With vit c 100mg   fluticasone (FLONASE) 50 mcg/act nasal spray   No No   Si spray into each nostril daily   ibuprofen (MOTRIN) 400 mg tablet   No No   Sig: Take 1 tablet by mouth every 6 (six) hours as needed for mild pain   ibuprofen (MOTRIN) 600 mg tablet   No No   Sig: Take 1 tablet (600 mg total) by mouth every 6 (six) hours as needed for mild pain   levocetirizine (XYZAL) 5 MG tablet   Yes No   Sig: Take 5 mg by mouth every evening   levothyroxine 200 mcg tablet   Yes No   Sig: Take 175 mcg by mouth daily    naproxen (NAPROSYN) 500 mg tablet   No No   Sig: Take 1 tablet (500 mg total) by mouth 2 (two) times a day with meals for 10 days   ondansetron (ZOFRAN-ODT) 4 mg disintegrating tablet   No No   Sig: Take 1 tablet (4 mg total) by mouth every 6 (six) hours as needed for nausea   ondansetron (Zofran ODT) 4 mg disintegrating tablet   No No   Sig: Take 1 tablet (4 mg total) by mouth every 6 (six) hours as needed for nausea or vomiting   oxymetazoline (AFRIN) 0 05 % nasal spray   No No   Si sprays by Each Nare route 2 (two) times a day   traZODone (DESYREL) 50 mg tablet   Yes No   Sig: Take 50 mg by mouth daily at bedtime      Facility-Administered Medications: None       Past Medical History:   Diagnosis Date   • Anemia     iron def   • Anxiety    • Depression    • Disease of thyroid gland     hypo   • GERD (gastroesophageal reflux disease)    • Hypertension     occ but no meds at present   • Papilledema     pt is on Diamox and followed by optho   • Seasonal allergies        Past Surgical History:   Procedure Laterality Date   • HEMORRHOID SURGERY N/A 10/09/2020    Procedure: EXCISION LIPOMA OF BUTTOCKS;  Surgeon: Rl Ireland MD;  Location: [address] [place] [address];  Service: Colorectal   • STOMACH SURGERY      gastric sleeve   • TOOTH EXTRACTION         Family History   Problem Relation Age of Onset   • Diabetes Mother    • Hypertension Mother    • Heart disease Father    • Diabetes Father    • Cancer Father      I have reviewed and agree with the history as documented  E-Cigarette/Vaping   • E-Cigarette Use Never User      E-Cigarette/Vaping Substances     Social History     Tobacco Use   • Smoking status: Former     Types: Cigarettes     Start date: 10/6/2005   • Smokeless tobacco: Never   Vaping Use   • Vaping Use: Never used   Substance Use Topics   • Alcohol use: Not Currently   • Drug use: Never       Review of Systems   Respiratory: Negative for shortness of breath  Cardiovascular: Negative for chest pain and palpitations  Gastrointestinal: Positive for abdominal pain (Cramping) and nausea  Negative for vomiting     Genitourinary: Positive for vaginal bleeding  Negative for difficulty urinating, dysuria, frequency, genital sores, hematuria and vaginal discharge  Neurological: Positive for light-headedness and headaches  Negative for syncope  Psychiatric/Behavioral: Negative for confusion  Physical Exam  Physical Exam  Vitals and nursing note reviewed  Exam conducted with a chaperone present (Sheri, ED tech )  Constitutional:       General: She is not in acute distress  Appearance: She is well-developed  Comments: Awake, alert, interactive and resting in the stretcher in no acute distress  Patient is not ill or toxic appearing  HENT:      Head: Normocephalic and atraumatic  Mouth/Throat:      Mouth: Mucous membranes are moist    Eyes:      Conjunctiva/sclera: Conjunctivae normal       Comments: No conjunctival pallor  Cardiovascular:      Rate and Rhythm: Normal rate and regular rhythm  Heart sounds: No murmur heard  Pulmonary:      Effort: Pulmonary effort is normal  No respiratory distress  Breath sounds: Normal breath sounds  Abdominal:      Palpations: Abdomen is soft  Tenderness: There is no abdominal tenderness  Comments: Patient reports generalized tenderness, worse in the lower abdomen  No specific point TTP  No guarding, rebound or overlying skin changes  Genitourinary:     General: Normal vulva  Comments: Mild amount of blood on the external vagina  Small amount of blood in the vaginal vault and coming from the cervix  External os is closed  No clots appreciated  No discharge appreciated  No lacerations  No lesions or sores  Musculoskeletal:         General: No swelling  Cervical back: Neck supple  Skin:     General: Skin is warm and dry  Capillary Refill: Capillary refill takes less than 2 seconds  Neurological:      General: No focal deficit present  Mental Status: She is alert and oriented to person, place, and time  GCS: GCS eye subscore is 4   GCS verbal subscore is 5  GCS motor subscore is 6     Psychiatric:         Mood and Affect: Mood normal          Vital Signs  ED Triage Vitals [05/13/23 2036]   Temperature Pulse Respirations Blood Pressure SpO2   97 7 °F (36 5 °C) 95 20 152/99 100 %      Temp Source Heart Rate Source Patient Position - Orthostatic VS BP Location FiO2 (%)   Oral Monitor Sitting Right arm --      Pain Score       7           Vitals:    05/13/23 2036   BP: 152/99   Pulse: 95   Patient Position - Orthostatic VS: Sitting         Visual Acuity      ED Medications  Medications   sodium chloride 0 9 % bolus 1,000 mL (0 mL Intravenous Stopped 5/13/23 2328)   acetaminophen (TYLENOL) tablet 650 mg (650 mg Oral Given 5/13/23 2135)   ondansetron (ZOFRAN) injection 4 mg (4 mg Intravenous Given 5/13/23 2149)   ketorolac (TORADOL) injection 15 mg (15 mg Intravenous Given 5/13/23 2255)       Diagnostic Studies  Results Reviewed     Procedure Component Value Units Date/Time    Protime-INR [[id number]]  (Normal) Collected: 05/13/23 2151    Lab Status: Final result Specimen: Blood from Arm, Left Updated: 05/13/23 2218     Protime 13 5 seconds      INR 1 03    APTT [[id number]]  (Normal) Collected: 05/13/23 2151    Lab Status: Final result Specimen: Blood from Arm, Left Updated: 05/13/23 2218     PTT 30 seconds     POCT pregnancy, urine [[id number]]  (Normal) Resulted: 05/13/23 2217    Lab Status: Final result Updated: 05/13/23 2217     EXT Preg Test, Ur Negative     Control Valid    Comprehensive metabolic panel [[id number]] Collected: 05/13/23 2151    Lab Status: Final result Specimen: Blood from Arm, Left Updated: 05/13/23 2215     Sodium 137 mmol/L      Potassium 4 1 mmol/L      Chloride 105 mmol/L      CO2 28 mmol/L      ANION GAP 4 mmol/L      BUN 14 mg/dL      Creatinine 0 86 mg/dL      Glucose 104 mg/dL      Calcium 9 0 mg/dL      AST 24 U/L      ALT 26 U/L      Alkaline Phosphatase 57 U/L      Total Protein 6 8 g/dL      Albumin 4 0 g/dL      Total Bilirubin 0 26 mg/dL      eGFR 85 ml/min/1 73sq m     Narrative:      National Kid[employer] Foundation guidelines for Chronic Kidney Disease (CKD):   •  Stage 1 with normal or high GFR (GFR > 90 mL/min/1 73 square meters)  •  Stage 2 Mild CKD (GFR = 60-89 mL/min/1 73 square meters)  •  Stage 3A Moderate CKD (GFR = 45-59 mL/min/1 73 square meters)  •  Stage 3B Moderate CKD (GFR = 30-44 mL/min/1 73 square meters)  •  Stage 4 Severe CKD (GFR = 15-29 mL/min/1 73 square meters)  •  Stage 5 End Stage CKD (GFR <15 mL/min/1 73 square meters)  Note: GFR calculation is accurate only with a steady state creatinine    CBC and differential [[id number]]  (Abnormal) Collected: 05/13/23 2151    Lab Status: Final result Specimen: Blood from Arm, Left Updated: 05/13/23 2201     WBC 10 54 Thousand/uL      RBC 4 07 Million/uL      Hemoglobin 11 4 g/dL      Hematocrit 35 0 %      MCV 86 fL      MCH 28 0 pg      MCHC 32 6 g/dL      RDW 12 0 %      MPV 9 8 fL      Platelets 231 Thousands/uL      nRBC 0 /100 WBCs      Neutrophils Relative 45 %      Immat GRANS % 0 %      Lymphocytes Relative 45 %      Monocytes Relative 6 %      Eosinophils Relative 4 %      Basophils Relative 0 %      Neutrophils Absolute 4 76 Thousands/µL      Immature Grans Absolute 0 02 Thousand/uL      Lymphocytes Absolute 4 75 Thousands/µL      Monocytes Absolute 0 59 Thousand/µL      Eosinophils Absolute 0 38 Thousand/µL      Basophils Absolute 0 04 Thousands/µL                  No orders to display              Procedures  Procedures         ED Course  ED Course as of 05/13/23 2345   Sat May 13, 2023   2204 WBC(!): 10 54   2204 Hemoglobin(!): 11 4  Slightly decreased from patient's baseline  2213 On bedside reevaluation patient resting comfortably in the stretcher no acute distress, on iPad  No concerns or needs at this time     2233 PREGNANCY TEST URINE: Negative  Negative   2233 Platelet Count: 273   2233 Creatinine: 0 86   2233 POCT INR: 1 03   2233 PTT: 30   2252 "Patient continues to rest in the stretcher in no acute distress  States she is still bleeding somewhat but is improved from earlier  States she has changed her pad 2 times since being in the ED  No recurrent lightheadedness  Says she feels well to go home  Advised patient to closely monitor her symptoms  Any worsening of her vaginal bleeding, lightheadedness or any other concerns she should return to the ED  Otherwise follow-up with her OB/GYN and PCP on Monday for reevaluation and further management  Continue ibuprofen for abnormal uterine bleeding and pain control  Continue to stay adequately hydrated  Strict return precautions verbally communicated to the patient as outlined in the AVS   All patient questions and concerns were answered  Patient verbally communicated their understanding and agreement to the above plan  Patient stable at discharge  Portions of the record may have been created with voice recognition software  Occasional wrong word or \"sound a like\" substitutions may have occurred due to the inherent limitations of voice recognition software  Read the chart carefully and recognize, using context, where substitutions have occurred  SBIRT 22yo+    Flowsheet Row Most Recent Value   Initial Alcohol Screen: US AUDIT-C     1  How often do you have a drink containing alcohol? 0 Filed at: 05/13/2023 2221   2  How many drinks containing alcohol do you have on a typical day you are drinking? 0 Filed at: 05/13/2023 2221   3a  Male UNDER 65: How often do you have five or more drinks on one occasion? 0 Filed at: 05/13/2023 2221   3b  FEMALE Any Age, or MALE 65+: How often do you have 4 or more drinks on one occassion? 0 Filed at: 05/13/2023 2221   Audit-C Score 0 Filed at: 05/13/2023 2221   VISHNU: How many times in the past year have you    Used an illegal drug or used a prescription medication for non-medical reasons?  Never Filed at: 05/13/2023 2221    " Medical Decision Making  41-year-old female presents ED with complaint of vaginal bleeding over the last 3 days  Patient reports that is her menstrual cycle however is bleeding more than usual   States she does get her menstrual cycle regularly, last was last month  Reports has been going through multiple pads/tampons per hour and is bleeding through them  Reports this has happened 1 other time, last year however has not occurred since  Also complains of abdominal cramping however states it feels worse than her normal cramping  Has been having some intermittent lightheadedness today which she describes as feelings like she would pass out however denies any syncope  Also had a slow onset of a headache which has been persistent throughout today  Described as bilateral frontal   It is not the worst headache of her life  No complaints of focal deficits  No other specific complaints today  Has otherwise been well  DDx including but not limited to: Polyp, adenomyosis, fibroid, malignancy, ectopic pregnancy, threatened , missed , incomplete , anemia, coagulopathy, DUB, tumor, retained products of conception, PCOS; doubt ovarian torsion or ruptured ovarian cyst    We will check a CBC for signs of anemia, BMP for electrolytes and renal function  Will check a PT/INR and APTT to ensure normal coagulation  POCT pregnant and T&S in light of vaginal bleeding  Will treat symptomatically with acetaminophen and 1 LNS  Disposition pending labs and reevaluation      Abnormal uterine bleeding (AUB): acute illness or injury  Anemia: chronic illness or injury  HA (headache): acute illness or injury  Lightheadedness: acute illness or injury      Disposition  Final diagnoses:   Abnormal uterine bleeding (AUB)   Anemia   Lightheadedness   HA (headache)     Time reflects when diagnosis was documented in both MDM as applicable and the Disposition within this note     Time User Action Codes Description Comment    5/13/2023 10:54 PM Neel Christianson Naveed Add [N93 9] Abnormal uterine bleeding (AUB)     5/13/2023 10:54 PM Alejandra Narvaez Add [D64 9] Anemia     5/13/2023 10:54 PM Guillermo Christiansone Naveed Add [R42] Lightheadedness     5/13/2023 10:54 PM Neel Christianson Naveed Add [R51 9] HA (headache)       ED Disposition     ED Disposition   Discharge    Condition   Stable    Date/Time   Sat May 13, 2023 10:53 PM    Comment   Jevon Gan discharge to home/self care  Follow-up Information     Follow up With Specialties Details Why Contact Info Additional [address] [facility] Emergency Department Emergency Medicine Go to  As needed, If symptoms worsen [place] [id number]  [address] [place] [address] Emergency Department, [address] [place] [address], [id number]          Patient's Medications   Discharge Prescriptions    No medications on file       No discharge procedures on file      PDMP Review     None          ED Provider  Electronically Signed by           Lien Perez PA-C  05/13/23 7223

## 2023-11-29 ENCOUNTER — HOSPITAL ENCOUNTER (EMERGENCY)
Facility: HOSPITAL | Age: 38
Discharge: HOME | End: 2023-11-29
Attending: EMERGENCY MEDICINE
Payer: COMMERCIAL

## 2023-11-29 ENCOUNTER — APPOINTMENT (EMERGENCY)
Dept: CT IMAGING | Facility: HOSPITAL | Age: 38
End: 2023-11-29
Payer: COMMERCIAL

## 2023-11-29 VITALS
HEART RATE: 77 BPM | BODY MASS INDEX: 31.83 KG/M2 | WEIGHT: 179.68 LBS | TEMPERATURE: 98.4 F | OXYGEN SATURATION: 97 % | RESPIRATION RATE: 17 BRPM | DIASTOLIC BLOOD PRESSURE: 91 MMHG | SYSTOLIC BLOOD PRESSURE: 139 MMHG

## 2023-11-29 DIAGNOSIS — J02.9 ACUTE PHARYNGITIS: Primary | ICD-10-CM

## 2023-11-29 LAB
ANION GAP SERPL CALCULATED.3IONS-SCNC: 5 MMOL/L
BASOPHILS # BLD AUTO: 0.06 THOUSANDS/ÂΜL (ref 0–0.1)
BASOPHILS NFR BLD AUTO: 0 % (ref 0–1)
BUN SERPL-MCNC: 11 MG/DL (ref 5–25)
CALCIUM SERPL-MCNC: 9.2 MG/DL (ref 8.4–10.2)
CHLORIDE SERPL-SCNC: 106 MMOL/L (ref 96–108)
CO2 SERPL-SCNC: 26 MMOL/L (ref 21–32)
CREAT SERPL-MCNC: 0.8 MG/DL (ref 0.6–1.3)
EOSINOPHIL # BLD AUTO: 0.39 THOUSAND/ÂΜL (ref 0–0.61)
EOSINOPHIL NFR BLD AUTO: 3 % (ref 0–6)
ERYTHROCYTE [DISTWIDTH] IN BLOOD BY AUTOMATED COUNT: 12.5 % (ref 11.6–15.1)
EXT PREGNANCY TEST URINE: NEGATIVE
EXT. CONTROL: NORMAL
FLUAV RNA RESP QL NAA+PROBE: NEGATIVE
FLUBV RNA RESP QL NAA+PROBE: NEGATIVE
GFR SERPL CREATININE-BSD FRML MDRD: 93 ML/MIN/1.73SQ M
GLUCOSE SERPL-MCNC: 138 MG/DL (ref 65–140)
HCT VFR BLD AUTO: 37.6 % (ref 34.8–46.1)
HGB BLD-MCNC: 12.3 G/DL (ref 11.5–15.4)
IMM GRANULOCYTES # BLD AUTO: 0.05 THOUSAND/UL (ref 0–0.2)
IMM GRANULOCYTES NFR BLD AUTO: 0 % (ref 0–2)
LYMPHOCYTES # BLD AUTO: 3.2 THOUSANDS/ÂΜL (ref 0.6–4.47)
LYMPHOCYTES NFR BLD AUTO: 23 % (ref 14–44)
MCH RBC QN AUTO: 27.2 PG (ref 26.8–34.3)
MCHC RBC AUTO-ENTMCNC: 32.7 G/DL (ref 31.4–37.4)
MCV RBC AUTO: 83 FL (ref 82–98)
MONOCYTES # BLD AUTO: 0.74 THOUSAND/ÂΜL (ref 0.17–1.22)
MONOCYTES NFR BLD AUTO: 5 % (ref 4–12)
NEUTROPHILS # BLD AUTO: 9.3 THOUSANDS/ÂΜL (ref 1.85–7.62)
NEUTS SEG NFR BLD AUTO: 69 % (ref 43–75)
NRBC BLD AUTO-RTO: 0 /100 WBCS
PLATELET # BLD AUTO: 325 THOUSANDS/UL (ref 149–390)
PMV BLD AUTO: 9.5 FL (ref 8.9–12.7)
POTASSIUM SERPL-SCNC: 3.9 MMOL/L (ref 3.5–5.3)
RBC # BLD AUTO: 4.53 MILLION/UL (ref 3.81–5.12)
RSV RNA RESP QL NAA+PROBE: NEGATIVE
S PYO DNA THROAT QL NAA+PROBE: NOT DETECTED
SARS-COV-2 RNA RESP QL NAA+PROBE: NEGATIVE
SODIUM SERPL-SCNC: 137 MMOL/L (ref 135–147)
WBC # BLD AUTO: 13.74 THOUSAND/UL (ref 4.31–10.16)

## 2023-11-29 PROCEDURE — 70491 CT SOFT TISSUE NECK W/DYE: CPT

## 2023-11-29 PROCEDURE — 0241U HB NFCT DS VIR RESP RNA 4 TRGT: CPT

## 2023-11-29 PROCEDURE — 81025 URINE PREGNANCY TEST: CPT

## 2023-11-29 PROCEDURE — 87651 STREP A DNA AMP PROBE: CPT

## 2023-11-29 PROCEDURE — 80048 BASIC METABOLIC PNL TOTAL CA: CPT

## 2023-11-29 PROCEDURE — 85025 COMPLETE CBC W/AUTO DIFF WBC: CPT

## 2023-11-29 PROCEDURE — 99285 EMERGENCY DEPT VISIT HI MDM: CPT

## 2023-11-29 PROCEDURE — 96374 THER/PROPH/DIAG INJ IV PUSH: CPT

## 2023-11-29 PROCEDURE — G1004 CDSM NDSC: HCPCS

## 2023-11-29 PROCEDURE — 99284 EMERGENCY DEPT VISIT MOD MDM: CPT

## 2023-11-29 PROCEDURE — 36415 COLL VENOUS BLD VENIPUNCTURE: CPT

## 2023-11-29 PROCEDURE — 96375 TX/PRO/DX INJ NEW DRUG ADDON: CPT

## 2023-11-29 RX ORDER — DEXAMETHASONE SODIUM PHOSPHATE 10 MG/ML
10 INJECTION, SOLUTION INTRAMUSCULAR; INTRAVENOUS ONCE
Status: COMPLETED | OUTPATIENT
Start: 2023-11-29 | End: 2023-11-29

## 2023-11-29 RX ORDER — KETOROLAC TROMETHAMINE 30 MG/ML
15 INJECTION, SOLUTION INTRAMUSCULAR; INTRAVENOUS ONCE
Status: COMPLETED | OUTPATIENT
Start: 2023-11-29 | End: 2023-11-29

## 2023-11-29 RX ORDER — NAPROXEN 500 MG/1
500 TABLET ORAL 2 TIMES DAILY WITH MEALS
Qty: 30 TABLET | Refills: 0 | Status: SHIPPED | OUTPATIENT
Start: 2023-11-29

## 2023-11-29 RX ORDER — AMOXICILLIN 500 MG/1
500 CAPSULE ORAL 2 TIMES DAILY
Qty: 20 CAPSULE | Refills: 0 | Status: SHIPPED | OUTPATIENT
Start: 2023-11-29 | End: 2023-12-09

## 2023-11-29 RX ADMIN — IOHEXOL 85 ML: 350 INJECTION, SOLUTION INTRAVENOUS at 20:22

## 2023-11-29 RX ADMIN — DEXAMETHASONE SODIUM PHOSPHATE 10 MG: 10 INJECTION INTRAMUSCULAR; INTRAVENOUS at 18:06

## 2023-11-29 RX ADMIN — KETOROLAC TROMETHAMINE 15 MG: 30 INJECTION, SOLUTION INTRAMUSCULAR; INTRAVENOUS at 18:05

## 2023-11-29 NOTE — Clinical Note
Norma Meng was seen and treated in our emergency department on 11/29/2023.    ?    ? ? Diagnosis: ?    Paulie Bonilla  may return to work on return date. She may return on this date: 12/01/2023    ? If you have any questions or concerns, please don't hesitate to call.       Karel Powell PA-C    ______________________________           _______________          _______________  Hospital Representative                              Date                                Time

## 2023-11-29 NOTE — Clinical Note
Alysia Angeles accompanied Priscilla Newsome to the emergency department on 11/29/2023. Return date if applicable: 71/89/0336        If you have any questions or concerns, please don't hesitate to call.       Isidoro Galan PA-C

## 2023-11-29 NOTE — ED PROVIDER NOTES
History  Chief Complaint   Patient presents with    Sore Throat - Complicated     Pt c/o sore throat since Saturday. Pt also reports N/V/D since yesterday. Pt reports difficulty swallowing and breathing when laying down     Patient is a 69-year-old female with a history of hypothyroidism, GERD, hypertension presenting for evaluation of sore throat for 4 days. Pain is worse on the left side. Reports difficulty swallowing and breathing when lying flat. She also developed nausea, vomiting, diarrhea yesterday. No fevers, nasal congestion, cough, abdominal pain. No known sick contacts. No medications prior to arrival.          Prior to Admission Medications   Prescriptions Last Dose Informant Patient Reported? Taking?    EPINEPHrine (EPIPEN JR) 0.15 mg/0.3 mL SOAJ   Yes No   Sig: Inject 0.15 mg into a muscle once   Skin Protectants, Misc. (eucerin) cream   No No   Sig: Apply topically 2 (two) times a day for 10 days Apply BID to affected area   acetaZOLAMIDE (DIAMOX) 250 mg tablet   Yes No   Sig: Take 250 mg by mouth 2 (two) times a day    acetaminophen (TYLENOL) 325 mg tablet   No No   Sig: Take 2 tablets (650 mg total) by mouth every 6 (six) hours as needed for mild pain   benzonatate (TESSALON PERLES) 100 mg capsule   No No   Sig: Take 1 capsule (100 mg total) by mouth every 8 (eight) hours   brompheniramine-pseudoephedrine-DM 30-2-10 MG/5ML syrup   No No   Sig: Take 5 mL by mouth 4 (four) times a day as needed for allergies   docusate sodium (COLACE) 100 mg capsule   Yes No   Sig: Take 100 mg by mouth 2 (two) times a day   famotidine (PEPCID) 20 mg tablet   Yes No   Sig: Take 20 mg by mouth daily    famotidine (PEPCID) 20 mg tablet   No No   Sig: Take 1 tablet (20 mg total) by mouth 2 (two) times a day   ferrous sulfate 325 (65 Fe) mg tablet   Yes No   Sig: Take 325 mg by mouth daily with breakfast With vit c 100mg   fluticasone (FLONASE) 50 mcg/act nasal spray   No No   Si spray into each nostril daily ibuprofen (MOTRIN) 400 mg tablet   No No   Sig: Take 1 tablet by mouth every 6 (six) hours as needed for mild pain   ibuprofen (MOTRIN) 600 mg tablet   No No   Sig: Take 1 tablet (600 mg total) by mouth every 6 (six) hours as needed for mild pain   levocetirizine (XYZAL) 5 MG tablet   Yes No   Sig: Take 5 mg by mouth every evening   levothyroxine 200 mcg tablet   Yes No   Sig: Take 175 mcg by mouth daily    naproxen (NAPROSYN) 500 mg tablet   No No   Sig: Take 1 tablet (500 mg total) by mouth 2 (two) times a day with meals for 10 days   ondansetron (ZOFRAN-ODT) 4 mg disintegrating tablet   No No   Sig: Take 1 tablet (4 mg total) by mouth every 6 (six) hours as needed for nausea   ondansetron (Zofran ODT) 4 mg disintegrating tablet   No No   Sig: Take 1 tablet (4 mg total) by mouth every 6 (six) hours as needed for nausea or vomiting   oxymetazoline (AFRIN) 0.05 % nasal spray   No No   Si sprays by Each Nare route 2 (two) times a day   traZODone (DESYREL) 50 mg tablet   Yes No   Sig: Take 50 mg by mouth daily at bedtime      Facility-Administered Medications: None       Past Medical History:   Diagnosis Date    Anemia     iron def    Anxiety     Depression     Disease of thyroid gland     hypo    GERD (gastroesophageal reflux disease)     Hypertension     occ but no meds at present    Papilledema     pt is on Diamox and followed by optho    Seasonal allergies        Past Surgical History:   Procedure Laterality Date    HEMORRHOID SURGERY N/A 10/09/2020    Procedure: EXCISION LIPOMA OF BUTTOCKS;  Surgeon: Nisha Hudson MD;  Location: [facility] MAIN OR;  Service: Colorectal    STOMACH SURGERY      gastric sleeve    TOOTH EXTRACTION         Family History   Problem Relation Age of Onset    Diabetes Mother     Hypertension Mother     Heart disease Father     Diabetes Father     Cancer Father      I have reviewed and agree with the history as documented.     E-Cigarette/Vaping    E-Cigarette Use Never User E-Cigarette/Vaping Substances     Social History     Tobacco Use    Smoking status: Former     Types: Cigarettes     Start date: 10/6/2005    Smokeless tobacco: Never   Vaping Use    Vaping Use: Never used   Substance Use Topics    Alcohol use: Not Currently    Drug use: Never       Review of Systems   Constitutional:  Negative for chills and fever. HENT:  Positive for sore throat. Negative for congestion and ear pain. Eyes:  Negative for pain and visual disturbance. Respiratory:  Negative for cough and shortness of breath. Cardiovascular:  Negative for chest pain and palpitations. Gastrointestinal:  Positive for diarrhea, nausea and vomiting. Negative for abdominal pain. Genitourinary:  Negative for dysuria, flank pain and hematuria. Musculoskeletal:  Negative for arthralgias and back pain. Skin:  Negative for color change and rash. Neurological:  Negative for seizures and syncope. All other systems reviewed and are negative. Physical Exam  Physical Exam  Vitals and nursing note reviewed. Constitutional:       General: She is not in acute distress. Appearance: Normal appearance. HENT:      Head: Normocephalic and atraumatic. Right Ear: Tympanic membrane, ear canal and external ear normal.      Left Ear: Tympanic membrane, ear canal and external ear normal.      Nose: Nose normal.      Mouth/Throat:      Mouth: Mucous membranes are moist.      Pharynx: Uvula midline. Oropharyngeal exudate and posterior oropharyngeal erythema present. Tonsils: 3+ on the right. 3+ on the left. Comments: Airway patent, managing oral secretions. No tongue elevation or trismus. Eyes:      General: No scleral icterus. Right eye: No discharge. Left eye: No discharge. Extraocular Movements: Extraocular movements intact. Conjunctiva/sclera: Conjunctivae normal.   Cardiovascular:      Rate and Rhythm: Normal rate and regular rhythm. Pulses: Normal pulses. Heart sounds: Normal heart sounds. Pulmonary:      Effort: Pulmonary effort is normal. No respiratory distress. Breath sounds: Normal breath sounds. Abdominal:      Palpations: Abdomen is soft. Tenderness: There is no abdominal tenderness. Musculoskeletal:         General: No tenderness, deformity or signs of injury. Cervical back: Normal range of motion and neck supple. Skin:     General: Skin is dry. Coloration: Skin is not jaundiced. Findings: No erythema or rash. Neurological:      General: No focal deficit present. Mental Status: She is alert and oriented to person, place, and time. Mental status is at baseline. Motor: No weakness. Gait: Gait normal.   Psychiatric:         Mood and Affect: Mood normal.         Behavior: Behavior normal.         Thought Content:  Thought content normal.         Vital Signs  ED Triage Vitals   Temperature Pulse Respirations Blood Pressure SpO2   11/29/23 1629 11/29/23 1629 11/29/23 1629 11/29/23 1629 11/29/23 1629   98.4 °F (36.9 °C) 91 18 149/95 97 %      Temp Source Heart Rate Source Patient Position - Orthostatic VS BP Location FiO2 (%)   11/29/23 1629 11/29/23 1629 11/29/23 1629 11/29/23 1629 --   Oral Monitor Sitting Right arm       Pain Score       11/29/23 1805       8           Vitals:    11/29/23 2045 11/29/23 2100 11/29/23 2200 11/29/23 2230   BP: (!) 158/102 147/100 137/92 139/91   Pulse: 81 76 73 77   Patient Position - Orthostatic VS: Lying Lying Lying Lying         Visual Acuity      ED Medications  Medications   dexamethasone (PF) (DECADRON) injection 10 mg (10 mg Intravenous Given 11/29/23 1806)   ketorolac (TORADOL) injection 15 mg (15 mg Intravenous Given 11/29/23 1805)   iohexol (OMNIPAQUE) 350 MG/ML injection (MULTI-DOSE) 85 mL (85 mL Intravenous Given 11/29/23 2022)       Diagnostic Studies  Results Reviewed       Procedure Component Value Units Date/Time    Basic metabolic panel [[id number]] Collected: 11/29/23 1800    Lab Status: Final result Specimen: Blood from Arm, Left Updated: 11/29/23 1823     Sodium 137 mmol/L      Potassium 3.9 mmol/L      Chloride 106 mmol/L      CO2 26 mmol/L      ANION GAP 5 mmol/L      BUN 11 mg/dL      Creatinine 0.80 mg/dL      Glucose 138 mg/dL      Calcium 9.2 mg/dL      eGFR 93 ml/min/1.73sq m     Narrative:      [facility] guidelines for Chronic Kidney Disease (CKD):     Stage 1 with normal or high GFR (GFR > 90 mL/min/1.73 square meters)    Stage 2 Mild CKD (GFR = 60-89 mL/min/1.73 square meters)    Stage 3A Moderate CKD (GFR = 45-59 mL/min/1.73 square meters)    Stage 3B Moderate CKD (GFR = 30-44 mL/min/1.73 square meters)    Stage 4 Severe CKD (GFR = 15-29 mL/min/1.73 square meters)    Stage 5 End Stage CKD (GFR <15 mL/min/1.73 square meters)  Note: GFR calculation is accurate only with a steady state creatinine    CBC and differential [[id number]]  (Abnormal) Collected: 11/29/23 1800    Lab Status: Final result Specimen: Blood from Arm, Left Updated: 11/29/23 1807     WBC 13.74 Thousand/uL      RBC 4.53 Million/uL      Hemoglobin 12.3 g/dL      Hematocrit 37.6 %      MCV 83 fL      MCH 27.2 pg      MCHC 32.7 g/dL      RDW 12.5 %      MPV 9.5 fL      Platelets 951 Thousands/uL      nRBC 0 /100 WBCs      Neutrophils Relative 69 %      Immat GRANS % 0 %      Lymphocytes Relative 23 %      Monocytes Relative 5 %      Eosinophils Relative 3 %      Basophils Relative 0 %      Neutrophils Absolute 9.30 Thousands/µL      Immature Grans Absolute 0.05 Thousand/uL      Lymphocytes Absolute 3.20 Thousands/µL      Monocytes Absolute 0.74 Thousand/µL      Eosinophils Absolute 0.39 Thousand/µL      Basophils Absolute 0.06 Thousands/µL     FLU/RSV/COVID - if FLU/RSV clinically relevant [[id number]]  (Normal) Collected: 11/29/23 1714    Lab Status: Final result Specimen: Nares from Nose Updated: 11/29/23 1802     SARS-CoV-2 Negative     INFLUENZA A PCR Negative INFLUENZA B PCR Negative     RSV PCR Negative    Narrative:      FOR PEDIATRIC PATIENTS - copy/paste COVID Guidelines URL to browser: [email or web address]    SARS-CoV-2 assay is a Nucleic Acid Amplification assay intended for the  qualitative detection of nucleic acid from SARS-CoV-2 in nasopharyngeal  swabs. Results are for the presumptive identification of SARS-CoV-2 RNA. Positive results are indicative of infection with SARS-CoV-2, the virus  causing COVID-19, but do not rule out bacterial infection or co-infection  with other viruses. Laboratories within the [facility] and its  territories are required to report all positive results to the appropriate  public health authorities. Negative results do not preclude SARS-CoV-2  infection and should not be used as the sole basis for treatment or other  patient management decisions. Negative results must be combined with  clinical observations, patient history, and epidemiological information. This test has not been FDA cleared or approved. This test has been authorized by FDA under an Emergency Use Authorization  (EUA). This test is only authorized for the duration of time the  declaration that circumstances exist justifying the authorization of the  emergency use of an in vitro diagnostic tests for detection of SARS-CoV-2  virus and/or diagnosis of COVID-19 infection under section 564(b)(1) of  the Act, 21 U. S.C. 156BCN-4(N)(2), unless the authorization is terminated  or revoked sooner. The test has been validated but independent review by FDA  and CLIA is pending. Test performed using [employer] Gene[employer]pert: This RT-PCR assay targets N2,  a region unique to SARS-CoV-2. A conserved region in the E-gene was chosen  for pan-Sarbecovirus detection which includes SARS-CoV-2. According to CMS-2020-01-R, this platform meets the definition of high-throughput technology.     Strep A PCR [[id number]]  (Normal) Collected: 11/29/23 1714    Lab Status: Final result Specimen: Throat Updated: 11/29/23 1753     STREP A PCR Not Detected    POCT pregnancy, urine [[id number]]  (Normal) Resulted: 11/29/23 1720    Lab Status: Edited Result - FINAL Updated: 11/29/23 1721     EXT Preg Test, Ur Negative     Control Valid                   CT soft tissue neck with contrast   Final Result by Meghan Ro MD (11/29 2223)      Findings consistent with acute pharyngitis, more prominent in the left pharynx and hypopharynx. No evidence of abscess. Workstation performed: [id number]                    Procedures  Procedures         ED Course  ED Course as of 11/30/23 0016   Wed Nov 29, 2023 1735 PREGNANCY TEST URINE: Negative   1822 STREP A PCR: Not Detected   4873 FLU/RSV/COVID - if FLU/RSV clinically relevant  Negative. 1822 WBC(!): 13.74   2042 Updated pt on labs. Pain decreased from 8/10 to 1/10. CT scan pending. 2225 CT soft tissue neck with contrast  IMPRESSION:     Findings consistent with acute pharyngitis, more prominent in the left pharynx and hypopharynx. No evidence of abscess. SBIRT 20yo+      Flowsheet Row Most Recent Value   Initial Alcohol Screen: US AUDIT-C     1. How often do you have a drink containing alcohol? 0 Filed at: 11/29/2023 1659   2. How many drinks containing alcohol do you have on a typical day you are drinking? 0 Filed at: 11/29/2023 1659   3b. FEMALE Any Age, or MALE 65+: How often do you have 4 or more drinks on one occassion? 0 Filed at: 11/29/2023 1659   Audit-C Score 0 Filed at: 11/29/2023 1659   VISHNU: How many times in the past year have you. .. Used an illegal drug or used a prescription medication for non-medical reasons? Never Filed at: 11/29/2023 1659                      Medical Decision Making  Patient is a 39-year-old female presenting for evaluation of sore throat for 4 days and nausea, vomiting, diarrhea.   All vitals stable on arrival. Physical exam markable for significant bilateral tonsillar swelling with erythema and exudates. Will obtain strep, COVID/flu/RSV, basic labs, CT soft tissue neck to evaluate for abscess. Will treat symptomatically with Decadron and Toradol. Leukocytosis at 13.74. Remaining labs negative for anemia, lecture abnormalities, EVERARDO. Urine pregnancy negative. COVID/flu/RSV and strep test negative. CT shows acute pharyngitis without evidence of abscess. Patient reports improvement in symptoms with Decadron and Toradol. Given appearance of tonsils with exudates, will treat with amoxicillin despite negative strep test.  Instructed take Motrin Tylenol as needed for pain. I have discussed findings and plan for discharge with the patient/caregiver. Follow up with the appropriate providers including primary care physician was discussed. Return precautions discussed with patient/caregiver as outlined in AVS. Patient/caregiver verbally expressed understanding. Patient stable at time of discharge and ambulated out of the emergency department. Amount and/or Complexity of Data Reviewed  Labs: ordered. Decision-making details documented in ED Course. Radiology: ordered. Decision-making details documented in ED Course. Risk  Prescription drug management. Disposition  Final diagnoses:   Acute pharyngitis     Time reflects when diagnosis was documented in both MDM as applicable and the Disposition within this note       Time User Action Codes Description Comment    11/29/2023 10:25 PM Elva Speed Add [J02.9] Acute pharyngitis           ED Disposition       ED Disposition   Discharge    Condition   Stable    Date/Time   Wed Nov 29, 2023 [facility] [address] discharge to home/self care.                    Follow-up Information       Follow up With Specialties Details Why 55 Zeinab Orozco MD    [address] [place] [id number]              Discharge Medication List as of 11/29/2023 10:27 PM        START taking these medications    Details   amoxicillin (AMOXIL) 500 mg capsule Take 1 capsule (500 mg total) by mouth 2 (two) times a day for 10 days, Starting Wed 11/29/2023, Until Sat 12/9/2023, Normal           CONTINUE these medications which have CHANGED    Details   naproxen (Naprosyn) 500 mg tablet Take 1 tablet (500 mg total) by mouth 2 (two) times a day with meals, Starting Wed 11/29/2023, Normal           CONTINUE these medications which have NOT CHANGED    Details   acetaminophen (TYLENOL) 325 mg tablet Take 2 tablets (650 mg total) by mouth every 6 (six) hours as needed for mild pain, Starting Mon 6/20/2022, Normal      acetaZOLAMIDE (DIAMOX) 250 mg tablet Take 250 mg by mouth 2 (two) times a day , Historical Med      benzonatate (TESSALON PERLES) 100 mg capsule Take 1 capsule (100 mg total) by mouth every 8 (eight) hours, Starting Sat 11/20/2021, Normal      brompheniramine-pseudoephedrine-DM 30-2-10 MG/5ML syrup Take 5 mL by mouth 4 (four) times a day as needed for allergies, Starting Sat 11/20/2021, Normal      docusate sodium (COLACE) 100 mg capsule Take 100 mg by mouth 2 (two) times a day, Historical Med      EPINEPHrine (EPIPEN JR) 0.15 mg/0.3 mL SOAJ Inject 0.15 mg into a muscle once, Historical Med      !! famotidine (PEPCID) 20 mg tablet Take 20 mg by mouth daily , Historical Med      !! famotidine (PEPCID) 20 mg tablet Take 1 tablet (20 mg total) by mouth 2 (two) times a day, Starting Sat 5/7/2022, Print      ferrous sulfate 325 (65 Fe) mg tablet Take 325 mg by mouth daily with breakfast With vit c 100mg, Historical Med      fluticasone (FLONASE) 50 mcg/act nasal spray 1 spray into each nostril daily, Starting Sat 11/20/2021, Normal      !! ibuprofen (MOTRIN) 400 mg tablet Take 1 tablet by mouth every 6 (six) hours as needed for mild pain, Starting 5/23/2017, Until Discontinued, Print      !! ibuprofen (MOTRIN) 600 mg tablet Take 1 tablet (600 mg total) by mouth every 6 (six) hours as needed for mild pain, Starting Sat 11/20/2021, Normal      levocetirizine (XYZAL) 5 MG tablet Take 5 mg by mouth every evening, Historical Med      levothyroxine 200 mcg tablet Take 175 mcg by mouth daily , Historical Med      !! ondansetron (Zofran ODT) 4 mg disintegrating tablet Take 1 tablet (4 mg total) by mouth every 6 (six) hours as needed for nausea or vomiting, Starting Mon 6/20/2022, Normal      !! ondansetron (ZOFRAN-ODT) 4 mg disintegrating tablet Take 1 tablet (4 mg total) by mouth every 6 (six) hours as needed for nausea, Starting Sat 5/7/2022, Print      oxymetazoline (AFRIN) 0.05 % nasal spray 2 sprays by Each Nare route 2 (two) times a day, Starting Sat 11/20/2021, Normal      Skin Protectants, Misc. (eucerin) cream Apply topically 2 (two) times a day for 10 days Apply BID to affected area, Starting Sun 1/23/2022, Until Wed 2/2/2022, Print      traZODone (DESYREL) 50 mg tablet Take 50 mg by mouth daily at bedtime, Historical Med       !! - Potential duplicate medications found. Please discuss with provider. No discharge procedures on file.     PDMP Review       None            ED Provider  Electronically Signed by             Karel Powell PA-C  11/30/23 0016

## 2023-11-29 NOTE — Clinical Note
Lukas Reed accompanied Leslie Thorpe to the emergency department on 11/29/2023. Return date if applicable: 46/39/9068        If you have any questions or concerns, please don't hesitate to call.       Cathie Dasilva PA-C

## (undated) DEVICE — BULB SYRINGE,IRRIGATION WITH PROTECTIVE CAP: Brand: DOVER

## (undated) DEVICE — ADHESIVE SKIN HIGH VISCOSITY EXOFIN 1ML

## (undated) DEVICE — SYRINGE 10ML LL CONTROL TOP

## (undated) DEVICE — SPONGE 4 X 4 XRAY 16 PLY STRL LF RFD

## (undated) DEVICE — SYRINGE 50ML LL

## (undated) DEVICE — SUT SILK 3-0 18 IN A184H

## (undated) DEVICE — 3M™ DURAPORE™ SURGICAL TAPE 1538-3, 3 INCH X 10 YARD (7,5CM X 9,1M), 4 ROLLS/BOX: Brand: 3M™ DURAPORE™

## (undated) DEVICE — STERILE POLYISOPRENE POWDER-FREE SURGICAL GLOVES WITH EMOLLIENT COATING: Brand: PROTEXIS

## (undated) DEVICE — LIGHT GLOVE GREEN

## (undated) DEVICE — TELFA NON-ADHERENT ABSORBENT DRESSING: Brand: TELFA

## (undated) DEVICE — TUBING SUCTION 5MM X 12 FT

## (undated) DEVICE — 4-PORT MANIFOLD: Brand: NEPTUNE 2

## (undated) DEVICE — MINOR PROCEDURE DRAPE: Brand: CONVERTORS

## (undated) DEVICE — YANKAUER,OPEN TIP, NO VENT: Brand: ARGYLE

## (undated) DEVICE — INTENDED FOR TISSUE SEPARATION, AND OTHER PROCEDURES THAT REQUIRE A SHARP SURGICAL BLADE TO PUNCTURE OR CUT.: Brand: BARD-PARKER SAFETY BLADES SIZE 15, STERILE

## (undated) DEVICE — STERILE POLYISOPRENE POWDER-FREE SURGICAL GLOVES: Brand: PROTEXIS

## (undated) DEVICE — NEEDLE 25G X 1 1/2

## (undated) DEVICE — 1820 FOAM BLOCK NEEDLE COUNTER: Brand: DEVON

## (undated) DEVICE — SKIN MARKER DUAL TIP WITH RULER CAP, FLEXIBLE RULER AND LABELS: Brand: DEVON

## (undated) DEVICE — CHLORAPREP HI-LITE 26ML ORANGE

## (undated) DEVICE — NEEDLE BLUNT 18 G X 1 1/2IN

## (undated) DEVICE — PENCIL ELECTROSURG E-Z CLEAN -0035H

## (undated) DEVICE — SUT MONOCRYL 4-0 PS-2 27 IN Y426H

## (undated) DEVICE — ELECTRODE BLADE MOD  E-Z CLEAN 6.5IN -0014M

## (undated) DEVICE — BASIC PACK: Brand: CONVERTORS